# Patient Record
Sex: MALE | Race: WHITE | NOT HISPANIC OR LATINO | Employment: OTHER | URBAN - METROPOLITAN AREA
[De-identification: names, ages, dates, MRNs, and addresses within clinical notes are randomized per-mention and may not be internally consistent; named-entity substitution may affect disease eponyms.]

---

## 2018-10-05 ENCOUNTER — LAB REQUISITION (OUTPATIENT)
Dept: LAB | Facility: HOSPITAL | Age: 78
End: 2018-10-05

## 2018-10-05 DIAGNOSIS — Z00.00 ROUTINE GENERAL MEDICAL EXAMINATION AT A HEALTH CARE FACILITY: ICD-10-CM

## 2018-10-05 LAB
ANION GAP SERPL CALCULATED.3IONS-SCNC: 7 MMOL/L (ref 3–11)
BASOPHILS # BLD AUTO: 0.03 10*3/MM3 (ref 0–0.2)
BASOPHILS NFR BLD AUTO: 0.5 % (ref 0–1)
BNP SERPL-MCNC: 1963 PG/ML (ref 0–100)
BUN BLD-MCNC: 22 MG/DL (ref 9–23)
BUN/CREAT SERPL: 19 (ref 7–25)
CALCIUM SPEC-SCNC: 9.3 MG/DL (ref 8.7–10.4)
CHLORIDE SERPL-SCNC: 108 MMOL/L (ref 99–109)
CO2 SERPL-SCNC: 31 MMOL/L (ref 20–31)
CREAT BLD-MCNC: 1.16 MG/DL (ref 0.6–1.3)
DEPRECATED RDW RBC AUTO: 58.4 FL (ref 37–54)
EOSINOPHIL # BLD AUTO: 0.03 10*3/MM3 (ref 0–0.3)
EOSINOPHIL NFR BLD AUTO: 0.5 % (ref 0–3)
ERYTHROCYTE [DISTWIDTH] IN BLOOD BY AUTOMATED COUNT: 17.6 % (ref 11.3–14.5)
GFR SERPL CREATININE-BSD FRML MDRD: 61 ML/MIN/1.73
GFR SERPL CREATININE-BSD FRML MDRD: 73 ML/MIN/1.73
GLUCOSE BLD-MCNC: 136 MG/DL (ref 70–100)
HCT VFR BLD AUTO: 45.9 % (ref 38.9–50.9)
HGB BLD-MCNC: 13.9 G/DL (ref 13.1–17.5)
IMM GRANULOCYTES # BLD: 0.02 10*3/MM3 (ref 0–0.03)
IMM GRANULOCYTES NFR BLD: 0.3 % (ref 0–0.6)
LYMPHOCYTES # BLD AUTO: 0.98 10*3/MM3 (ref 0.6–4.8)
LYMPHOCYTES NFR BLD AUTO: 14.8 % (ref 24–44)
MCH RBC QN AUTO: 27.9 PG (ref 27–31)
MCHC RBC AUTO-ENTMCNC: 30.3 G/DL (ref 32–36)
MCV RBC AUTO: 92 FL (ref 80–99)
MONOCYTES # BLD AUTO: 0.55 10*3/MM3 (ref 0–1)
MONOCYTES NFR BLD AUTO: 8.3 % (ref 0–12)
NEUTROPHILS # BLD AUTO: 5.03 10*3/MM3 (ref 1.5–8.3)
NEUTROPHILS NFR BLD AUTO: 75.6 % (ref 41–71)
PLATELET # BLD AUTO: 153 10*3/MM3 (ref 150–450)
PMV BLD AUTO: 12.3 FL (ref 6–12)
POTASSIUM BLD-SCNC: 4.2 MMOL/L (ref 3.5–5.5)
RBC # BLD AUTO: 4.99 10*6/MM3 (ref 4.2–5.76)
SODIUM BLD-SCNC: 146 MMOL/L (ref 132–146)
WBC NRBC COR # BLD: 6.64 10*3/MM3 (ref 3.5–10.8)

## 2018-10-05 PROCEDURE — 85025 COMPLETE CBC W/AUTO DIFF WBC: CPT

## 2018-10-05 PROCEDURE — 83880 ASSAY OF NATRIURETIC PEPTIDE: CPT

## 2018-10-05 PROCEDURE — 80048 BASIC METABOLIC PNL TOTAL CA: CPT

## 2018-10-12 ENCOUNTER — APPOINTMENT (OUTPATIENT)
Dept: GENERAL RADIOLOGY | Facility: HOSPITAL | Age: 78
End: 2018-10-12

## 2018-10-12 ENCOUNTER — APPOINTMENT (OUTPATIENT)
Dept: CARDIOLOGY | Facility: HOSPITAL | Age: 78
End: 2018-10-12

## 2018-10-12 ENCOUNTER — HOSPITAL ENCOUNTER (OUTPATIENT)
Facility: HOSPITAL | Age: 78
Setting detail: OBSERVATION
Discharge: HOME | End: 2018-10-16
Attending: EMERGENCY MEDICINE | Admitting: EMERGENCY MEDICINE

## 2018-10-12 DIAGNOSIS — J44.1 COPD EXACERBATION (HCC): ICD-10-CM

## 2018-10-12 DIAGNOSIS — R06.03 RESPIRATORY DISTRESS: Primary | ICD-10-CM

## 2018-10-12 DIAGNOSIS — I50.23 ACUTE ON CHRONIC SYSTOLIC CONGESTIVE HEART FAILURE (HCC): ICD-10-CM

## 2018-10-12 DIAGNOSIS — J44.1 COPD WITH ACUTE EXACERBATION (HCC): ICD-10-CM

## 2018-10-12 DIAGNOSIS — Z74.09 IMPAIRED FUNCTIONAL MOBILITY, BALANCE, GAIT, AND ENDURANCE: ICD-10-CM

## 2018-10-12 PROBLEM — D69.6 THROMBOCYTOPENIA (HCC): Status: ACTIVE | Noted: 2018-10-12

## 2018-10-12 PROBLEM — R06.00 DYSPNEA: Status: ACTIVE | Noted: 2018-10-12

## 2018-10-12 PROBLEM — I50.9 ACUTE CHF (HCC): Status: ACTIVE | Noted: 2018-10-12

## 2018-10-12 PROBLEM — D64.9 ANEMIA: Status: ACTIVE | Noted: 2018-10-12

## 2018-10-12 LAB
ALBUMIN SERPL-MCNC: 3.85 G/DL (ref 3.2–4.8)
ALBUMIN/GLOB SERPL: 1.5 G/DL (ref 1.5–2.5)
ALP SERPL-CCNC: 100 U/L (ref 25–100)
ALT SERPL W P-5'-P-CCNC: 18 U/L (ref 7–40)
ANION GAP SERPL CALCULATED.3IONS-SCNC: 11 MMOL/L (ref 3–11)
ANION GAP SERPL CALCULATED.3IONS-SCNC: 9 MMOL/L (ref 3–11)
AST SERPL-CCNC: 17 U/L (ref 0–33)
BASOPHILS # BLD AUTO: 0 10*3/MM3 (ref 0–0.2)
BASOPHILS # BLD AUTO: 0.01 10*3/MM3 (ref 0–0.2)
BASOPHILS NFR BLD AUTO: 0 % (ref 0–1)
BASOPHILS NFR BLD AUTO: 0.1 % (ref 0–1)
BH CV ECHO MEAS - AO MAX PG (FULL): 1.7 MMHG
BH CV ECHO MEAS - AO MAX PG: 4 MMHG
BH CV ECHO MEAS - AO ROOT AREA (BSA CORRECTED): 1.8
BH CV ECHO MEAS - AO ROOT AREA: 10.9 CM^2
BH CV ECHO MEAS - AO ROOT DIAM: 3.7 CM
BH CV ECHO MEAS - AO V2 MAX: 104.4 CM/SEC
BH CV ECHO MEAS - AVA(V,A): 2.6 CM^2
BH CV ECHO MEAS - AVA(V,D): 2.6 CM^2
BH CV ECHO MEAS - BSA(HAYCOCK): 2.1 M^2
BH CV ECHO MEAS - BSA: 2 M^2
BH CV ECHO MEAS - BZI_BMI: 27.1 KILOGRAMS/M^2
BH CV ECHO MEAS - BZI_METRIC_HEIGHT: 177.8 CM
BH CV ECHO MEAS - BZI_METRIC_WEIGHT: 85.7 KG
BH CV ECHO MEAS - EDV(CUBED): 147.8 ML
BH CV ECHO MEAS - EDV(MOD-SP2): 98 ML
BH CV ECHO MEAS - EDV(MOD-SP4): 119 ML
BH CV ECHO MEAS - EDV(TEICH): 134.6 ML
BH CV ECHO MEAS - EF(CUBED): 41.4 %
BH CV ECHO MEAS - EF(MOD-SP2): 43.9 %
BH CV ECHO MEAS - EF(MOD-SP4): 43.7 %
BH CV ECHO MEAS - EF(TEICH): 34 %
BH CV ECHO MEAS - ESV(CUBED): 86.6 ML
BH CV ECHO MEAS - ESV(MOD-SP2): 55 ML
BH CV ECHO MEAS - ESV(MOD-SP4): 67 ML
BH CV ECHO MEAS - ESV(TEICH): 88.9 ML
BH CV ECHO MEAS - FS: 16.3 %
BH CV ECHO MEAS - IVS/LVPW: 0.71
BH CV ECHO MEAS - IVSD: 0.85 CM
BH CV ECHO MEAS - LA DIMENSION: 4 CM
BH CV ECHO MEAS - LA/AO: 1.1
BH CV ECHO MEAS - LAD MAJOR: 6.3 CM
BH CV ECHO MEAS - LAT PEAK E' VEL: 6.5 CM/SEC
BH CV ECHO MEAS - LATERAL E/E' RATIO: 13.8
BH CV ECHO MEAS - LV DIASTOLIC VOL/BSA (35-75): 58.4 ML/M^2
BH CV ECHO MEAS - LV MASS(C)D: 204.7 GRAMS
BH CV ECHO MEAS - LV MASS(C)DI: 100.5 GRAMS/M^2
BH CV ECHO MEAS - LV MAX PG: 2.3 MMHG
BH CV ECHO MEAS - LV SYSTOLIC VOL/BSA (12-30): 32.9 ML/M^2
BH CV ECHO MEAS - LV V1 MAX: 75.4 CM/SEC
BH CV ECHO MEAS - LVIDD: 5.3 CM
BH CV ECHO MEAS - LVIDS: 4.4 CM
BH CV ECHO MEAS - LVLD AP2: 8.3 CM
BH CV ECHO MEAS - LVLD AP4: 8.3 CM
BH CV ECHO MEAS - LVLS AP2: 7.5 CM
BH CV ECHO MEAS - LVLS AP4: 7.5 CM
BH CV ECHO MEAS - LVOT AREA (M): 3.5 CM^2
BH CV ECHO MEAS - LVOT AREA: 3.6 CM^2
BH CV ECHO MEAS - LVOT DIAM: 2.1 CM
BH CV ECHO MEAS - LVPWD: 1.1 CM
BH CV ECHO MEAS - MED PEAK E' VEL: 4.9 CM/SEC
BH CV ECHO MEAS - MEDIAL E/E' RATIO: 18.3
BH CV ECHO MEAS - MV A MAX VEL: 35.1 CM/SEC
BH CV ECHO MEAS - MV DEC TIME: 0.16 SEC
BH CV ECHO MEAS - MV E MAX VEL: 91 CM/SEC
BH CV ECHO MEAS - MV E/A: 2.6
BH CV ECHO MEAS - PA ACC SLOPE: 546.7 CM/SEC^2
BH CV ECHO MEAS - PA ACC TIME: 0.1 SEC
BH CV ECHO MEAS - PA PR(ACCEL): 32.7 MMHG
BH CV ECHO MEAS - PULM DIAS VEL: 67.9 CM/SEC
BH CV ECHO MEAS - PULM S/D: 0.34
BH CV ECHO MEAS - PULM SYS VEL: 23.1 CM/SEC
BH CV ECHO MEAS - RAP SYSTOLE: 15 MMHG
BH CV ECHO MEAS - RVDD: 3.5 CM
BH CV ECHO MEAS - RVSP: 37 MMHG
BH CV ECHO MEAS - SI(CUBED): 30 ML/M^2
BH CV ECHO MEAS - SI(MOD-SP2): 21.1 ML/M^2
BH CV ECHO MEAS - SI(MOD-SP4): 25.5 ML/M^2
BH CV ECHO MEAS - SI(TEICH): 22.4 ML/M^2
BH CV ECHO MEAS - SV(CUBED): 61.1 ML
BH CV ECHO MEAS - SV(MOD-SP2): 43 ML
BH CV ECHO MEAS - SV(MOD-SP4): 52 ML
BH CV ECHO MEAS - SV(TEICH): 45.7 ML
BH CV ECHO MEAS - TAPSE (>1.6): 1.4 CM2
BH CV ECHO MEAS - TR MAX PG: 22 MMHG
BH CV ECHO MEAS - TR MAX VEL: 236.4 CM/SEC
BH CV ECHO MEASUREMENTS AVERAGE E/E' RATIO: 15.96
BH CV XLRA - RV BASE: 4.2 CM
BH CV XLRA - RV LENGTH: 6.7 CM
BH CV XLRA - RV MID: 3.3 CM
BH CV XLRA - TDI S': 9.44 CM/SEC
BILIRUB SERPL-MCNC: 1.2 MG/DL (ref 0.3–1.2)
BNP SERPL-MCNC: 2091 PG/ML (ref 0–100)
BUN BLD-MCNC: 17 MG/DL (ref 9–23)
BUN BLD-MCNC: 17 MG/DL (ref 9–23)
BUN/CREAT SERPL: 14.7 (ref 7–25)
BUN/CREAT SERPL: 15.9 (ref 7–25)
CALCIUM SPEC-SCNC: 8.9 MG/DL (ref 8.7–10.4)
CALCIUM SPEC-SCNC: 9.2 MG/DL (ref 8.7–10.4)
CHLORIDE SERPL-SCNC: 107 MMOL/L (ref 99–109)
CHLORIDE SERPL-SCNC: 108 MMOL/L (ref 99–109)
CO2 SERPL-SCNC: 28 MMOL/L (ref 20–31)
CO2 SERPL-SCNC: 29 MMOL/L (ref 20–31)
CREAT BLD-MCNC: 1.07 MG/DL (ref 0.6–1.3)
CREAT BLD-MCNC: 1.16 MG/DL (ref 0.6–1.3)
DEPRECATED RDW RBC AUTO: 57.4 FL (ref 37–54)
DEPRECATED RDW RBC AUTO: 57.8 FL (ref 37–54)
EOSINOPHIL # BLD AUTO: 0 10*3/MM3 (ref 0–0.3)
EOSINOPHIL # BLD AUTO: 0.03 10*3/MM3 (ref 0–0.3)
EOSINOPHIL NFR BLD AUTO: 0 % (ref 0–3)
EOSINOPHIL NFR BLD AUTO: 0.3 % (ref 0–3)
ERYTHROCYTE [DISTWIDTH] IN BLOOD BY AUTOMATED COUNT: 17.5 % (ref 11.3–14.5)
ERYTHROCYTE [DISTWIDTH] IN BLOOD BY AUTOMATED COUNT: 17.6 % (ref 11.3–14.5)
GFR SERPL CREATININE-BSD FRML MDRD: 61 ML/MIN/1.73
GFR SERPL CREATININE-BSD FRML MDRD: 66 ML/MIN/1.73
GLOBULIN UR ELPH-MCNC: 2.6 GM/DL
GLUCOSE BLD-MCNC: 119 MG/DL (ref 70–100)
GLUCOSE BLD-MCNC: 95 MG/DL (ref 70–100)
HCT VFR BLD AUTO: 41.6 % (ref 38.9–50.9)
HCT VFR BLD AUTO: 42.2 % (ref 38.9–50.9)
HGB BLD-MCNC: 12.6 G/DL (ref 13.1–17.5)
HGB BLD-MCNC: 12.7 G/DL (ref 13.1–17.5)
IMM GRANULOCYTES # BLD: 0.02 10*3/MM3 (ref 0–0.03)
IMM GRANULOCYTES # BLD: 0.03 10*3/MM3 (ref 0–0.03)
IMM GRANULOCYTES NFR BLD: 0.3 % (ref 0–0.6)
IMM GRANULOCYTES NFR BLD: 0.3 % (ref 0–0.6)
LV EF 2D ECHO EST: 25 %
LYMPHOCYTES # BLD AUTO: 0.49 10*3/MM3 (ref 0.6–4.8)
LYMPHOCYTES # BLD AUTO: 1.07 10*3/MM3 (ref 0.6–4.8)
LYMPHOCYTES NFR BLD AUTO: 10.4 % (ref 24–44)
LYMPHOCYTES NFR BLD AUTO: 7.2 % (ref 24–44)
MAXIMAL PREDICTED HEART RATE: 140 BPM
MCH RBC QN AUTO: 27.1 PG (ref 27–31)
MCH RBC QN AUTO: 27.4 PG (ref 27–31)
MCHC RBC AUTO-ENTMCNC: 30.1 G/DL (ref 32–36)
MCHC RBC AUTO-ENTMCNC: 30.3 G/DL (ref 32–36)
MCV RBC AUTO: 90 FL (ref 80–99)
MCV RBC AUTO: 90.4 FL (ref 80–99)
MONOCYTES # BLD AUTO: 0.07 10*3/MM3 (ref 0–1)
MONOCYTES # BLD AUTO: 1.05 10*3/MM3 (ref 0–1)
MONOCYTES NFR BLD AUTO: 1 % (ref 0–12)
MONOCYTES NFR BLD AUTO: 10.2 % (ref 0–12)
NEUTROPHILS # BLD AUTO: 6.27 10*3/MM3 (ref 1.5–8.3)
NEUTROPHILS # BLD AUTO: 8.11 10*3/MM3 (ref 1.5–8.3)
NEUTROPHILS NFR BLD AUTO: 79 % (ref 41–71)
NEUTROPHILS NFR BLD AUTO: 91.8 % (ref 41–71)
PLATELET # BLD AUTO: 114 10*3/MM3 (ref 150–450)
PLATELET # BLD AUTO: 132 10*3/MM3 (ref 150–450)
PMV BLD AUTO: 11.6 FL (ref 6–12)
PMV BLD AUTO: 12 FL (ref 6–12)
POTASSIUM BLD-SCNC: 3.8 MMOL/L (ref 3.5–5.5)
POTASSIUM BLD-SCNC: 4.1 MMOL/L (ref 3.5–5.5)
PROT SERPL-MCNC: 6.4 G/DL (ref 5.7–8.2)
RBC # BLD AUTO: 4.6 10*6/MM3 (ref 4.2–5.76)
RBC # BLD AUTO: 4.69 10*6/MM3 (ref 4.2–5.76)
SODIUM BLD-SCNC: 145 MMOL/L (ref 132–146)
SODIUM BLD-SCNC: 147 MMOL/L (ref 132–146)
STRESS TARGET HR: 119 BPM
TROPONIN I SERPL-MCNC: 0 NG/ML (ref 0–0.07)
WBC NRBC COR # BLD: 10.27 10*3/MM3 (ref 3.5–10.8)
WBC NRBC COR # BLD: 6.83 10*3/MM3 (ref 3.5–10.8)

## 2018-10-12 PROCEDURE — 63710000001 FLUDROCORTISONE 0.1 MG TABLET: Performed by: NURSE PRACTITIONER

## 2018-10-12 PROCEDURE — 99220 PR INITIAL OBSERVATION CARE/DAY 70 MINUTES: CPT | Performed by: INTERNAL MEDICINE

## 2018-10-12 PROCEDURE — 94640 AIRWAY INHALATION TREATMENT: CPT

## 2018-10-12 PROCEDURE — A9270 NON-COVERED ITEM OR SERVICE: HCPCS | Performed by: NURSE PRACTITIONER

## 2018-10-12 PROCEDURE — 85025 COMPLETE CBC W/AUTO DIFF WBC: CPT | Performed by: EMERGENCY MEDICINE

## 2018-10-12 PROCEDURE — G0378 HOSPITAL OBSERVATION PER HR: HCPCS

## 2018-10-12 PROCEDURE — 85025 COMPLETE CBC W/AUTO DIFF WBC: CPT | Performed by: NURSE PRACTITIONER

## 2018-10-12 PROCEDURE — 94644 CONT INHLJ TX 1ST HOUR: CPT

## 2018-10-12 PROCEDURE — 25010000002 METHYLPREDNISOLONE PER 125 MG: Performed by: EMERGENCY MEDICINE

## 2018-10-12 PROCEDURE — 25010000002 FUROSEMIDE PER 20 MG: Performed by: EMERGENCY MEDICINE

## 2018-10-12 PROCEDURE — 96374 THER/PROPH/DIAG INJ IV PUSH: CPT

## 2018-10-12 PROCEDURE — 71045 X-RAY EXAM CHEST 1 VIEW: CPT

## 2018-10-12 PROCEDURE — 96372 THER/PROPH/DIAG INJ SC/IM: CPT

## 2018-10-12 PROCEDURE — 96376 TX/PRO/DX INJ SAME DRUG ADON: CPT

## 2018-10-12 PROCEDURE — 63710000001 DOCUSATE SODIUM 100 MG CAPSULE: Performed by: NURSE PRACTITIONER

## 2018-10-12 PROCEDURE — 63710000001 CITALOPRAM 20 MG TABLET: Performed by: NURSE PRACTITIONER

## 2018-10-12 PROCEDURE — 96375 TX/PRO/DX INJ NEW DRUG ADDON: CPT

## 2018-10-12 PROCEDURE — 25010000002 FUROSEMIDE PER 20 MG: Performed by: NURSE PRACTITIONER

## 2018-10-12 PROCEDURE — 80053 COMPREHEN METABOLIC PANEL: CPT | Performed by: EMERGENCY MEDICINE

## 2018-10-12 PROCEDURE — 25010000002 HEPARIN (PORCINE) PER 1000 UNITS: Performed by: NURSE PRACTITIONER

## 2018-10-12 PROCEDURE — 93306 TTE W/DOPPLER COMPLETE: CPT

## 2018-10-12 PROCEDURE — 63710000001 ATORVASTATIN 40 MG TABLET: Performed by: NURSE PRACTITIONER

## 2018-10-12 PROCEDURE — 99285 EMERGENCY DEPT VISIT HI MDM: CPT

## 2018-10-12 PROCEDURE — 84484 ASSAY OF TROPONIN QUANT: CPT

## 2018-10-12 PROCEDURE — 93005 ELECTROCARDIOGRAM TRACING: CPT | Performed by: EMERGENCY MEDICINE

## 2018-10-12 PROCEDURE — 83880 ASSAY OF NATRIURETIC PEPTIDE: CPT | Performed by: EMERGENCY MEDICINE

## 2018-10-12 PROCEDURE — 93306 TTE W/DOPPLER COMPLETE: CPT | Performed by: INTERNAL MEDICINE

## 2018-10-12 PROCEDURE — 63710000001 PANTOPRAZOLE 40 MG TABLET DELAYED-RELEASE: Performed by: NURSE PRACTITIONER

## 2018-10-12 RX ORDER — ATORVASTATIN CALCIUM 40 MG/1
40 TABLET, FILM COATED ORAL DAILY
COMMUNITY

## 2018-10-12 RX ORDER — FUROSEMIDE 20 MG/1
20 TABLET ORAL DAILY
COMMUNITY
End: 2018-10-16 | Stop reason: HOSPADM

## 2018-10-12 RX ORDER — IPRATROPIUM BROMIDE AND ALBUTEROL SULFATE 2.5; .5 MG/3ML; MG/3ML
3 SOLUTION RESPIRATORY (INHALATION) ONCE
Status: COMPLETED | OUTPATIENT
Start: 2018-10-12 | End: 2018-10-12

## 2018-10-12 RX ORDER — SODIUM CHLORIDE 0.9 % (FLUSH) 0.9 %
10 SYRINGE (ML) INJECTION AS NEEDED
Status: DISCONTINUED | OUTPATIENT
Start: 2018-10-12 | End: 2018-10-16 | Stop reason: HOSPADM

## 2018-10-12 RX ORDER — ATORVASTATIN CALCIUM 40 MG/1
40 TABLET, FILM COATED ORAL DAILY
Status: DISCONTINUED | OUTPATIENT
Start: 2018-10-12 | End: 2018-10-16 | Stop reason: HOSPADM

## 2018-10-12 RX ORDER — PANTOPRAZOLE SODIUM 40 MG/1
40 TABLET, DELAYED RELEASE ORAL DAILY
Status: DISCONTINUED | OUTPATIENT
Start: 2018-10-12 | End: 2018-10-16 | Stop reason: HOSPADM

## 2018-10-12 RX ORDER — CITALOPRAM 20 MG/1
20 TABLET ORAL DAILY
COMMUNITY

## 2018-10-12 RX ORDER — METHYLPREDNISOLONE SODIUM SUCCINATE 125 MG/2ML
125 INJECTION, POWDER, LYOPHILIZED, FOR SOLUTION INTRAMUSCULAR; INTRAVENOUS ONCE
Status: COMPLETED | OUTPATIENT
Start: 2018-10-12 | End: 2018-10-12

## 2018-10-12 RX ORDER — FLUDROCORTISONE ACETATE 0.1 MG/1
0.1 TABLET ORAL DAILY
COMMUNITY

## 2018-10-12 RX ORDER — POLYVINYL ALCOHOL 14 MG/ML
1 SOLUTION/ DROPS OPHTHALMIC
Status: DISCONTINUED | OUTPATIENT
Start: 2018-10-12 | End: 2018-10-16 | Stop reason: HOSPADM

## 2018-10-12 RX ORDER — HEPARIN SODIUM 5000 [USP'U]/ML
5000 INJECTION, SOLUTION INTRAVENOUS; SUBCUTANEOUS EVERY 12 HOURS SCHEDULED
Status: DISCONTINUED | OUTPATIENT
Start: 2018-10-12 | End: 2018-10-16 | Stop reason: HOSPADM

## 2018-10-12 RX ORDER — FUROSEMIDE 10 MG/ML
40 INJECTION INTRAMUSCULAR; INTRAVENOUS ONCE
Status: COMPLETED | OUTPATIENT
Start: 2018-10-12 | End: 2018-10-12

## 2018-10-12 RX ORDER — SODIUM CHLORIDE 0.9 % (FLUSH) 0.9 %
3-10 SYRINGE (ML) INJECTION AS NEEDED
Status: DISCONTINUED | OUTPATIENT
Start: 2018-10-12 | End: 2018-10-16 | Stop reason: HOSPADM

## 2018-10-12 RX ORDER — PANTOPRAZOLE SODIUM 40 MG/1
40 TABLET, DELAYED RELEASE ORAL DAILY
COMMUNITY

## 2018-10-12 RX ORDER — FUROSEMIDE 10 MG/ML
40 INJECTION INTRAMUSCULAR; INTRAVENOUS EVERY 12 HOURS
Status: DISCONTINUED | OUTPATIENT
Start: 2018-10-12 | End: 2018-10-14

## 2018-10-12 RX ORDER — FLUDROCORTISONE ACETATE 0.1 MG/1
0.1 TABLET ORAL DAILY
Status: DISCONTINUED | OUTPATIENT
Start: 2018-10-12 | End: 2018-10-16 | Stop reason: HOSPADM

## 2018-10-12 RX ORDER — ALBUTEROL SULFATE 2.5 MG/3ML
10 SOLUTION RESPIRATORY (INHALATION) CONTINUOUS
Status: DISPENSED | OUTPATIENT
Start: 2018-10-12 | End: 2018-10-12

## 2018-10-12 RX ORDER — ACETAMINOPHEN 325 MG/1
650 TABLET ORAL EVERY 4 HOURS PRN
Status: DISCONTINUED | OUTPATIENT
Start: 2018-10-12 | End: 2018-10-16 | Stop reason: HOSPADM

## 2018-10-12 RX ORDER — ENALAPRILAT 2.5 MG/2ML
1.25 INJECTION INTRAVENOUS EVERY 6 HOURS PRN
Status: DISCONTINUED | OUTPATIENT
Start: 2018-10-12 | End: 2018-10-14 | Stop reason: ALTCHOICE

## 2018-10-12 RX ORDER — CITALOPRAM 20 MG/1
20 TABLET ORAL DAILY
Status: DISCONTINUED | OUTPATIENT
Start: 2018-10-12 | End: 2018-10-16 | Stop reason: HOSPADM

## 2018-10-12 RX ORDER — SODIUM CHLORIDE 0.9 % (FLUSH) 0.9 %
3 SYRINGE (ML) INJECTION EVERY 12 HOURS SCHEDULED
Status: DISCONTINUED | OUTPATIENT
Start: 2018-10-12 | End: 2018-10-16 | Stop reason: HOSPADM

## 2018-10-12 RX ORDER — DOCUSATE SODIUM 100 MG/1
200 CAPSULE, LIQUID FILLED ORAL DAILY
Status: DISCONTINUED | OUTPATIENT
Start: 2018-10-12 | End: 2018-10-16 | Stop reason: HOSPADM

## 2018-10-12 RX ADMIN — FLUDROCORTISONE ACETATE 0.1 MG: 0.1 TABLET ORAL at 08:35

## 2018-10-12 RX ADMIN — FUROSEMIDE 40 MG: 10 INJECTION, SOLUTION INTRAMUSCULAR; INTRAVENOUS at 08:33

## 2018-10-12 RX ADMIN — HEPARIN SODIUM 5000 UNITS: 5000 INJECTION, SOLUTION INTRAVENOUS; SUBCUTANEOUS at 21:07

## 2018-10-12 RX ADMIN — ACETAMINOPHEN 650 MG: 325 TABLET ORAL at 21:07

## 2018-10-12 RX ADMIN — ALBUTEROL SULFATE 10 MG: 2.5 SOLUTION RESPIRATORY (INHALATION) at 02:43

## 2018-10-12 RX ADMIN — IPRATROPIUM BROMIDE AND ALBUTEROL SULFATE 3 ML: 2.5; .5 SOLUTION RESPIRATORY (INHALATION) at 02:43

## 2018-10-12 RX ADMIN — ATORVASTATIN CALCIUM 40 MG: 40 TABLET, FILM COATED ORAL at 08:33

## 2018-10-12 RX ADMIN — FUROSEMIDE 40 MG: 10 INJECTION, SOLUTION INTRAMUSCULAR; INTRAVENOUS at 02:35

## 2018-10-12 RX ADMIN — HEPARIN SODIUM 5000 UNITS: 5000 INJECTION, SOLUTION INTRAVENOUS; SUBCUTANEOUS at 08:33

## 2018-10-12 RX ADMIN — CITALOPRAM HYDROBROMIDE 20 MG: 20 TABLET ORAL at 08:33

## 2018-10-12 RX ADMIN — DOCUSATE SODIUM 200 MG: 100 CAPSULE, LIQUID FILLED ORAL at 08:33

## 2018-10-12 RX ADMIN — Medication 3 ML: at 21:00

## 2018-10-12 RX ADMIN — PANTOPRAZOLE SODIUM 40 MG: 40 TABLET, DELAYED RELEASE ORAL at 06:57

## 2018-10-12 RX ADMIN — Medication 3 ML: at 08:35

## 2018-10-12 RX ADMIN — ENALAPRILAT 1.25 MG: 1.25 INJECTION INTRAVENOUS at 10:02

## 2018-10-12 RX ADMIN — METHYLPREDNISOLONE SODIUM SUCCINATE 125 MG: 125 INJECTION, POWDER, FOR SOLUTION INTRAMUSCULAR; INTRAVENOUS at 02:35

## 2018-10-12 RX ADMIN — FUROSEMIDE 40 MG: 10 INJECTION, SOLUTION INTRAMUSCULAR; INTRAVENOUS at 21:07

## 2018-10-12 NOTE — H&P
[facility] Medicine Services  HISTORY AND PHYSICAL    Patient Name: Edwardo Diggs  : 1940  MRN: [id number]  Primary Care Physician: Provider, No Known  Date of admission: 10/12/2018      Subjective   Subjective     Chief Complaint:      HPI:  Edwardo Diggs is a 78 y.o. male w/ hx of CHF, COPD on 2 Liters O2 qhs prn presents w/ c/o increasing shortness of breath and inc o2 requirement w/ chest discomfort w/o radiation.  No n/v/f/c.  Does have cough.  Feels like this may be his copd.  Has received iv lasix in er and feels that this has helped as he has had uop x 2.  Notes pnd/orthopnea and leg swelling as well.      Review of Systems    Otherwise 10-system ROS reviewed and is negative except as mentioned in the HPI.    Personal History     Past Medical History:   Diagnosis Date   • Anemia    • Atherosclerosis    • CHF (congestive heart failure) (CMS/HCC)    • Constipation    • Depression    • Dermatitis    • GERD (gastroesophageal reflux disease)    • Hyperlipidemia    • Myocardial infarction (CMS/HCC)    • Orthostatic hypotension    • Parkinson's disease (CMS/HCC)    • TIA (transient ischemic attack)    • Tinea corporis        Past Surgical History:   Procedure Laterality Date   • CATARACT EXTRACTION     • CORONARY ARTERY BYPASS GRAFT         Family History: family history is not on file. not able to provide    Social History:  reports that he has quit smoking. He does not have any smokeless tobacco history on file. He reports that he does not drink alcohol or use drugs.  Social History     Social History Narrative   • No narrative on file       Medications:  Available home medication information reviewed     No Known Allergies    Objective   Objective     Vital Signs:   Temp:  [98.6 °F (37 °C)] 98.6 °F (37 °C)  Heart Rate:  [94-99] 97  Resp:  [18-24] 18  BP: (153)/(110-120) 153/110        Physical Exam    gen; alert, oriented to person, mild resp distress  Heent; perrla, eomi, mmm  Cv;  rr w/ tachycardia  L; faint crackles bll, no wheeze/poor inspir effort, mild distress  Abd; soft, +bs, ntnd  Ext; 3+ pitting edema, no cc  Skin; cdi, warm  Neuro; grossly intact  Psych; mood and affect appropriate    Results Reviewed:  I have personally reviewed current lab, radiology, and data and agree.      Results from last 7 days  Lab Units 10/12/18  0230   WBC 10*3/mm3 10.27   HEMOGLOBIN g/dL 12.7*   HEMATOCRIT % 42.2   PLATELETS 10*3/mm3 132*       Results from last 7 days  Lab Units 10/12/18  0230   SODIUM mmol/L 145   POTASSIUM mmol/L 4.1   CHLORIDE mmol/L 107   CO2 mmol/L 29.0   BUN mg/dL 17   CREATININE mg/dL 1.16   GLUCOSE mg/dL 95   CALCIUM mg/dL 9.2   ALT (SGPT) U/L 18   AST (SGOT) U/L 17     Estimated Creatinine Clearance: 61.6 mL/min (by C-G formula based on SCr of 1.16 mg/dL).  Brief Urine Lab Results     None        BNP   Date Value Ref Range Status   10/12/2018 2,091.0 (H) 0.0 - 100.0 pg/mL Final     Comment:     Results may be falsely decreased if patient taking Biotin.     Imaging Results (last 24 hours)     Procedure Component Value Units Date/Time    XR Chest 1 View [[id number]] Collected:  10/12/18 0209     Updated:  10/12/18 0308    Narrative:       EXAM:  XR Chest, 1 View    CLINICAL HISTORY:  78 years old, male; Pain; Chest pain; Left-sided chest pain; Additional info:   Chest pain/dyspnea    TECHNIQUE:  Frontal view of the chest.    COMPARISON:  No relevant prior studies available.    FINDINGS:  Lungs:  Left basilar opacity, likely atelectasis, although left lower   lobe/retrocardiac pneumonia possible.  Pleural space:  Small right pleural effusion with associated right basilar   atelectasis.  No pneumothorax.  Heart:  Cardiac silhouette is enlarged, compatible with cardiomegaly and/or   pericardial fluid.  Mediastinum:  Normal.  Bones/joints:  Changes of prior sternotomy.  Multilevel thoracic spine   degenerative changes.  Vasculature:  Atherosclerotic ossification of the thoracic  aorta.      Impression:         1.  Small right pleural effusion with associated right basilar atelectasis.    2.  Left basilar opacity, likely atelectasis, although left lower   lobe/retrocardiac pneumonia possible.  Recommend followup.    3.  Incidental/non-acute findings are described above.    THIS DOCUMENT HAS BEEN ELECTRONICALLY SIGNED BY ALEXSANDER MORENO MD             Assessment/Plan   Assessment / Plan     Active Hospital Problems    Diagnosis   • Dyspnea   • Acute CHF (CMS/HCC)   • COPD exacerbation (CMS/[facility])   • Anemia   • Thrombocytopenia (CMS/[facility])         Assessment & Plan:  77 y/o male w/ reported hx of copd, chf though details unknown here w/   1. prob acute systolic chf exacerbation;  Cont diuresis w/ 40 lasix iv bid for now; obtain echo and consult heart failure navigator.  Troponin negative.  2. ??copd exacerbation; think sx primarily due to above.  Will hold on further steroid use.  Nebs prn.  3. Anemia, mild; hx of; stable   4. Thrombocytopenia; monitor, mild.       DVT prophylaxis:heparin (monitor platelets)    CODE STATUS:  Full (unable to ask pt)  There are no questions and answers to display.       Admission Status:  I believe this patient meets  OBSERVATION status, however if further evaluation or treatment plans warrant, status may change.  Based upon current information, I predict patient's care encounter to be less than or equal to 2 midnights.      Electronically signed by Yumiko Walker MD, 10/12/18, 4:41 AM.

## 2018-10-12 NOTE — PROGRESS NOTES
IM update note:    Pt admitted after midnight with A/C CHF, has elevated BNP and significant LE edema and SOA-s/p diuresed with Lasix, not sure how much urine output is so far, RN recorded 1X urine output on chart. Will cont with current Lasix 40mg IV BID. Renal function still ok. Got steroid in ED too, but will not cont, I think this is CHF exac. Nebs PRN. Echo showed global hypokinesis with EF 25-30%. I will consult cardiology. Not sure who pt follows normally, his record here is scant.

## 2018-10-12 NOTE — PROGRESS NOTES
Discharge Planning Assessment  [facility]     Patient Name: Edwardo Diggs  MRN: [id number]  Today's Date: 10/12/2018    Admit Date: 10/12/2018          Discharge Needs Assessment     Row Name 10/12/18 1517       Living Environment    Lives With facility resident    Current Living Arrangements extended care facility    Duration at Residence Approximately 4 months    Primary Care Provided by other (see comments)   facility staff    Family Caregiver if Needed other (see comments)    Family Caregiver Names Facility staff on the [facility]    Quality of Family Relationships helpful;involved;supportive    Able to Return to Prior Arrangements yes    Living Arrangement Comments [facility] at [facility].       Resource/Environmental Concerns    Transportation Concerns other (see comments)       Transition Planning    Patient/Family Anticipates Transition to long term care facility    Transportation Anticipated family or friend will provide       Discharge Needs Assessment    Readmission Within the Last 30 Days no previous admission in last 30 days    Concerns to be Addressed denies needs/concerns at this time    Equipment Currently Used at Home rollator;oxygen    Equipment Needed After Discharge none    Discharge Facility/Level of Care Needs nursing facility, intermediate    Current Discharge Risk dependent with mobility/activities of daily living;physical impairment    Discharge Coordination/Progress Pt. lives in the [facility] at [facility].  Pt. will be able to return to [facility] at discharge.  Pt's son will assist with transportation if he is available.  Pt. uses a rollator and notornal O2 at the facility.  Pt. saw speech therapy when he first arrived to [facility] and was discharged from Physical Therapy on 10/1.  Pt. currently does not receive any therapy services.              Discharge Plan     Row Name 10/12/18 1528       Plan    Plan [facility]     Patient/Family in Agreement with Plan yes     Plan Comments SW attempted to visit pt, however he did not wake to speak with SW.  SW spoke to pt's son over the phone.  Pt's son stated pt. is not a good historian, especially when he is ill.  Pt. will return to [facility] at discharge.  Pt.'s son stated he is hopeful to assist with transportation at discharge.  At discharge, a D/C summary will need to faxed to [facility].  RN will need to call report as well.    Final Discharge Disposition Code 04 - intermediate care facility        Destination     No service coordination in this encounter.      Durable Medical Equipment     No service coordination in this encounter.      Dialysis/Infusion     No service coordination in this encounter.      Home Medical Care     No service coordination in this encounter.      Social Care     No service coordination in this encounter.                Demographic Summary     Row Name 10/12/18 1518       General Information    Admission Type observation    Arrived From [facility] term care    Preferred Language English     Used During This Interaction no    General Information Comments Pt. is followed by a provider at [facility] for medical care.            Functional Status     Row Name 10/12/18 1517       Functional Status, IADL    IADL Comments Per pt's son's report, pt. is ambulatory with a rollator.   Pt. is able to feed himself, but requires assistance for most other activities.       Employment/    Employment/ Comments Pt. has insurance through Medicare Part A only and [employer].            Psychosocial    No documentation.           Abuse/Neglect    No documentation.           Legal    No documentation.           Substance Abuse    No documentation.           Patient Forms    No documentation.         TATO Campos

## 2018-10-12 NOTE — ED PROVIDER NOTES
Subjective   Mr. Edwardo Diggs is a 78 y.o. male who presents to the ED with c/o chest pain onset today. Pt reports today he had sudden onset of chest pain localized to midsternum. He is unable to specify onset time and notes pain may be secondary to COPD exacerbation. He also complains of moderate SoA however he denies nausea, vomiting, fever, and any other acute sx at this time. Pt is poor historian and does not provide many details of present illness.  He is oriented to person, not time or place.             History provided by:  Patient  History limited by: disoriented   Chest Pain   Chest pain location: midsternum.  Pain severity:  Moderate  Onset quality:  Sudden  Progression:  Unchanged  Chronicity:  New  Relieved by:  None tried  Worsened by:  Nothing  Ineffective treatments:  None tried  Associated symptoms: shortness of breath    Associated symptoms: no fever, no nausea and no vomiting    Risk factors: male sex        Review of Systems   Unable to perform ROS: Other   Constitutional: Negative for fever.   Respiratory: Positive for shortness of breath.    Cardiovascular: Positive for chest pain.   Gastrointestinal: Negative for nausea and vomiting.       Past Medical History:   Diagnosis Date   • Anemia    • Atherosclerosis    • CHF (congestive heart failure) (CMS/HCC)    • Constipation    • Depression    • Dermatitis    • GERD (gastroesophageal reflux disease)    • Hyperlipidemia    • Myocardial infarction (CMS/HCC)    • Orthostatic hypotension    • Parkinson's disease (CMS/HCC)    • TIA (transient ischemic attack)    • Tinea corporis        No Known Allergies    Past Surgical History:   Procedure Laterality Date   • CATARACT EXTRACTION     • CORONARY ARTERY BYPASS GRAFT         History reviewed. No pertinent family history.    Social History     Social History   • Marital status:      Social History Main Topics   • Smoking status: Former Smoker   • Alcohol use No   • Drug use: No     Other Topics  Concern   • Not on file         Objective   Physical Exam   Constitutional: He appears well-developed and well-nourished. No distress.   Pt awake and alert. He is oriented to person   HENT:   Head: Normocephalic and atraumatic.   Eyes: Conjunctivae are normal. No scleral icterus.   Neck: Normal range of motion. Neck supple.   Cardiovascular: Normal heart sounds.  An irregular rhythm present. Tachycardia present.  Exam reveals no gallop and no friction rub.    No murmur heard.  Pulmonary/Chest: Accessory muscle usage present. Tachypnea noted. He is in respiratory distress (mild). He has wheezes (wheezes in bases bilaterally upon inspiration ). He has rales (crackles in bases bilaterally upon inspiration). He exhibits no tenderness.   Poor air movement.     Abdominal: Soft. Bowel sounds are normal. There is no tenderness.   Musculoskeletal: Normal range of motion. He exhibits edema (+3 edema in BLE, equal calf size).   Neurological: He is alert. He is disoriented (oriented to person). GCS eye subscore is 4. GCS verbal subscore is 5. GCS motor subscore is 6.   Skin: Skin is warm and dry.   Psychiatric: He has a normal mood and affect. His behavior is normal.   Nursing note and vitals reviewed.      Procedures         ED Course     Recent Results (from the past 24 hour(s))   Comprehensive Metabolic Panel    Collection Time: 10/12/18  2:30 AM   Result Value Ref Range    Glucose 95 70 - 100 mg/dL    BUN 17 9 - 23 mg/dL    Creatinine 1.16 0.60 - 1.30 mg/dL    Sodium 145 132 - 146 mmol/L    Potassium 4.1 3.5 - 5.5 mmol/L    Chloride 107 99 - 109 mmol/L    CO2 29.0 20.0 - 31.0 mmol/L    Calcium 9.2 8.7 - 10.4 mg/dL    Total Protein 6.4 5.7 - 8.2 g/dL    Albumin 3.85 3.20 - 4.80 g/dL    ALT (SGPT) 18 7 - 40 U/L    AST (SGOT) 17 0 - 33 U/L    Alkaline Phosphatase 100 25 - 100 U/L    Total Bilirubin 1.2 0.3 - 1.2 mg/dL    eGFR Non African Amer 61 >60 mL/min/1.73    Globulin 2.6 gm/dL    A/G Ratio 1.5 1.5 - 2.5 g/dL     BUN/Creatinine Ratio 14.7 7.0 - 25.0    Anion Gap 9.0 3.0 - 11.0 mmol/L   CBC Auto Differential    Collection Time: 10/12/18  2:30 AM   Result Value Ref Range    WBC 10.27 3.50 - 10.80 10*3/mm3    RBC 4.69 4.20 - 5.76 10*6/mm3    Hemoglobin 12.7 (L) 13.1 - 17.5 g/dL    Hematocrit 42.2 38.9 - 50.9 %    MCV 90.0 80.0 - 99.0 fL    MCH 27.1 27.0 - 31.0 pg    MCHC 30.1 (L) 32.0 - 36.0 g/dL    RDW 17.6 (H) 11.3 - 14.5 %    RDW-SD 57.8 (H) 37.0 - 54.0 fl    MPV 12.0 6.0 - 12.0 fL    Platelets 132 (L) 150 - 450 10*3/mm3    Neutrophil % 79.0 (H) 41.0 - 71.0 %    Lymphocyte % 10.4 (L) 24.0 - 44.0 %    Monocyte % 10.2 0.0 - 12.0 %    Eosinophil % 0.3 0.0 - 3.0 %    Basophil % 0.1 0.0 - 1.0 %    Immature Grans % 0.3 0.0 - 0.6 %    Neutrophils, Absolute 8.11 1.50 - 8.30 10*3/mm3    Lymphocytes, Absolute 1.07 0.60 - 4.80 10*3/mm3    Monocytes, Absolute 1.05 (H) 0.00 - 1.00 10*3/mm3    Eosinophils, Absolute 0.03 0.00 - 0.30 10*3/mm3    Basophils, Absolute 0.01 0.00 - 0.20 10*3/mm3    Immature Grans, Absolute 0.03 0.00 - 0.03 10*3/mm3   BNP    Collection Time: 10/12/18  2:30 AM   Result Value Ref Range    BNP 2,091.0 (H) 0.0 - 100.0 pg/mL   POC Troponin, Rapid    Collection Time: 10/12/18  2:35 AM   Result Value Ref Range    Troponin I 0.00 0.00 - 0.07 ng/mL     Note: In addition to lab results from this visit, the labs listed above may include labs taken at another facility or during a different encounter within the last 24 hours. Please correlate lab times with ED admission and discharge times for further clarification of the services performed during this visit.    XR Chest 1 View   Final Result      1.  Small right pleural effusion with associated right basilar atelectasis.      2.  Left basilar opacity, likely atelectasis, although left lower    lobe/retrocardiac pneumonia possible.  Recommend followup.      3.  Incidental/non-acute findings are described above.      THIS DOCUMENT HAS BEEN ELECTRONICALLY SIGNED BY ALEXSANDER MORENO  MD        Vitals:    10/12/18 0319 10/12/18 0326 10/12/18 0332 10/12/18 0333   BP:       BP Location:       Patient Position:       Pulse: 94 98 95 97   Resp:   18    Temp:       TempSrc:       SpO2: 98% 98% 97% 97%   Weight:       Height:         Medications   sodium chloride 0.9 % flush 10 mL (not administered)   albuterol (PROVENTIL) nebulizer solution 0.083% 2.5 mg/3mL (10 mg Nebulization New Bag 10/12/18 0243)   ipratropium-albuterol (DUO-NEB) nebulizer solution 3 mL (3 mL Nebulization Given 10/12/18 0243)   methylPREDNISolone sodium succinate (SOLU-Medrol) injection 125 mg (125 mg Intravenous Given 10/12/18 0235)   furosemide (LASIX) injection 40 mg (40 mg Intravenous Given 10/12/18 0235)     ECG/EMG Results (last 24 hours)     Procedure Component Value Units Date/Time    ECG 12 Lead [[id number]] Collected:  10/12/18 0206     Updated:  10/12/18 0208                        MDM  Number of Diagnoses or Management Options  Acute on chronic systolic congestive heart failure (CMS/HCC): new and requires workup  COPD with acute exacerbation (CMS/HCC): new and requires workup  Respiratory distress: new and requires workup  Diagnosis management comments: Patient presents with tachypnea, accessory muscle use, with concerning presentation of mild to moderate respiratory distress.    Lungs show wheezing and crackles throughout the bilateral bases.    Chest x-ray is concerning for pleural effusions and fluid collection in the bases.    BNP is elevated to greater than 2000.    Patient reportedly has had worsening symptoms for the last week and has been wearing oxygen constantly versus as just as needed.     The menstruation of albuterol and Atrovent neb treatment and steroids the patient's wheezing has resolved on repeat evaluation.    Crackles in the bases continues to be present.  40 mg of IV Lasix has been given.    I discussed the patient with the hospitalist, Dr. Walker.  He will be admitted for respiratory distress, COPD  exacerbation, CHF exacerbation.       Amount and/or Complexity of Data Reviewed  Clinical lab tests: ordered and reviewed  Tests in the radiology section of CPT®: ordered and reviewed  Decide to obtain previous medical records or to obtain history from someone other than the patient: yes  Obtain history from someone other than the patient: yes  Review and summarize past medical records: yes  Discuss the patient with other providers: yes  Independent visualization of images, tracings, or specimens: yes        Final diagnoses:   Respiratory distress   COPD with acute exacerbation (CMS/[facility])   Acute on chronic systolic congestive heart failure (CMS/[facility])       Documentation assistance provided by anita Abarca.  Information recorded by the anita was done at my direction and has been verified and validated by me.     Edwin Abarca  10/12/18 0209       Edwin Abarca  10/12/18 0214       Katherine Corbett MD  10/12/18 0459

## 2018-10-13 PROBLEM — I25.10 CAD (CORONARY ARTERY DISEASE): Status: ACTIVE | Noted: 2018-10-13

## 2018-10-13 PROBLEM — G45.9 TIA (TRANSIENT ISCHEMIC ATTACK): Status: ACTIVE | Noted: 2018-10-13

## 2018-10-13 PROBLEM — Z95.1 HX OF CABG: Status: ACTIVE | Noted: 2018-10-13

## 2018-10-13 PROBLEM — Z78.9 POOR HISTORIAN: Status: ACTIVE | Noted: 2018-10-13

## 2018-10-13 PROBLEM — Z86.73 HISTORY OF CARDIOEMBOLIC CEREBROVASCULAR ACCIDENT (CVA): Status: ACTIVE | Noted: 2018-10-13

## 2018-10-13 PROBLEM — I50.41 ACUTE COMBINED SYSTOLIC AND DIASTOLIC CONGESTIVE HEART FAILURE (HCC): Status: ACTIVE | Noted: 2018-10-12

## 2018-10-13 PROBLEM — J96.11 CHRONIC RESPIRATORY FAILURE WITH HYPOXIA (HCC): Status: ACTIVE | Noted: 2018-10-13

## 2018-10-13 PROBLEM — E78.5 DYSLIPIDEMIA: Status: ACTIVE | Noted: 2018-10-13

## 2018-10-13 LAB
ANION GAP SERPL CALCULATED.3IONS-SCNC: 3 MMOL/L (ref 3–11)
BNP SERPL-MCNC: 1209 PG/ML (ref 0–100)
BUN BLD-MCNC: 18 MG/DL (ref 9–23)
BUN/CREAT SERPL: 17 (ref 7–25)
CALCIUM SPEC-SCNC: 8.9 MG/DL (ref 8.7–10.4)
CHLORIDE SERPL-SCNC: 107 MMOL/L (ref 99–109)
CO2 SERPL-SCNC: 36 MMOL/L (ref 20–31)
CREAT BLD-MCNC: 1.06 MG/DL (ref 0.6–1.3)
GFR SERPL CREATININE-BSD FRML MDRD: 67 ML/MIN/1.73
GLUCOSE BLD-MCNC: 88 MG/DL (ref 70–100)
HBA1C MFR BLD: 5.7 % (ref 4.8–5.6)
POTASSIUM BLD-SCNC: 3.7 MMOL/L (ref 3.5–5.5)
SODIUM BLD-SCNC: 146 MMOL/L (ref 132–146)
TSH SERPL DL<=0.05 MIU/L-ACNC: 2.16 MIU/ML (ref 0.35–5.35)

## 2018-10-13 PROCEDURE — 99214 OFFICE O/P EST MOD 30 MIN: CPT | Performed by: INTERNAL MEDICINE

## 2018-10-13 PROCEDURE — 96372 THER/PROPH/DIAG INJ SC/IM: CPT

## 2018-10-13 PROCEDURE — 80048 BASIC METABOLIC PNL TOTAL CA: CPT | Performed by: HOSPITALIST

## 2018-10-13 PROCEDURE — 83036 HEMOGLOBIN GLYCOSYLATED A1C: CPT | Performed by: HOSPITALIST

## 2018-10-13 PROCEDURE — 83880 ASSAY OF NATRIURETIC PEPTIDE: CPT | Performed by: HOSPITALIST

## 2018-10-13 PROCEDURE — 25010000002 HEPARIN (PORCINE) PER 1000 UNITS: Performed by: NURSE PRACTITIONER

## 2018-10-13 PROCEDURE — 25010000002 FUROSEMIDE PER 20 MG: Performed by: NURSE PRACTITIONER

## 2018-10-13 PROCEDURE — G0378 HOSPITAL OBSERVATION PER HR: HCPCS

## 2018-10-13 PROCEDURE — 99226 PR SBSQ OBSERVATION CARE/DAY 35 MINUTES: CPT | Performed by: FAMILY MEDICINE

## 2018-10-13 PROCEDURE — 96376 TX/PRO/DX INJ SAME DRUG ADON: CPT

## 2018-10-13 PROCEDURE — 84443 ASSAY THYROID STIM HORMONE: CPT | Performed by: HOSPITALIST

## 2018-10-13 RX ORDER — CARVEDILOL 3.12 MG/1
3.12 TABLET ORAL EVERY 12 HOURS SCHEDULED
Status: DISCONTINUED | OUTPATIENT
Start: 2018-10-13 | End: 2018-10-14

## 2018-10-13 RX ORDER — ASPIRIN 81 MG/1
81 TABLET ORAL DAILY
Status: DISCONTINUED | OUTPATIENT
Start: 2018-10-13 | End: 2018-10-16 | Stop reason: HOSPADM

## 2018-10-13 RX ADMIN — ACETAMINOPHEN 650 MG: 325 TABLET ORAL at 23:10

## 2018-10-13 RX ADMIN — MICONAZOLE NITRATE: 2 CREAM TOPICAL at 13:31

## 2018-10-13 RX ADMIN — Medication 3 ML: at 22:07

## 2018-10-13 RX ADMIN — FLUDROCORTISONE ACETATE 0.1 MG: 0.1 TABLET ORAL at 08:35

## 2018-10-13 RX ADMIN — CARVEDILOL 3.12 MG: 3.12 TABLET, FILM COATED ORAL at 22:06

## 2018-10-13 RX ADMIN — FUROSEMIDE 40 MG: 10 INJECTION, SOLUTION INTRAMUSCULAR; INTRAVENOUS at 08:35

## 2018-10-13 RX ADMIN — ASPIRIN 81 MG: 81 TABLET, COATED ORAL at 15:57

## 2018-10-13 RX ADMIN — MICONAZOLE NITRATE: 2 CREAM TOPICAL at 22:13

## 2018-10-13 RX ADMIN — FUROSEMIDE 40 MG: 10 INJECTION, SOLUTION INTRAMUSCULAR; INTRAVENOUS at 22:06

## 2018-10-13 RX ADMIN — PANTOPRAZOLE SODIUM 40 MG: 40 TABLET, DELAYED RELEASE ORAL at 05:45

## 2018-10-13 RX ADMIN — CITALOPRAM HYDROBROMIDE 20 MG: 20 TABLET ORAL at 08:35

## 2018-10-13 RX ADMIN — HEPARIN SODIUM 5000 UNITS: 5000 INJECTION, SOLUTION INTRAVENOUS; SUBCUTANEOUS at 22:06

## 2018-10-13 RX ADMIN — HEPARIN SODIUM 5000 UNITS: 5000 INJECTION, SOLUTION INTRAVENOUS; SUBCUTANEOUS at 08:34

## 2018-10-13 RX ADMIN — ATORVASTATIN CALCIUM 40 MG: 40 TABLET, FILM COATED ORAL at 08:35

## 2018-10-13 NOTE — PROGRESS NOTES
[facility] Medicine Services  PROGRESS NOTE    Patient Name: Edwardo Diggs  : 1940  MRN: [id number]    Date of Admission: 10/12/2018  Length of Stay: 0  Primary Care Physician: Provider, No Known    Subjective   Subjective     CC:  A/C CHF    HPI:  Mild orthopnea and SOA with conversation.  Dementia, unreliable history.     ROS:  Otherwise ROS is negative except as mentioned in the HPI.    Objective   Objective     Vital Signs:   Temp:  [97.5 °F (36.4 °C)-98.1 °F (36.7 °C)] 98.1 °F (36.7 °C)  Heart Rate:  [70-79] 79  Resp:  [20] 20  BP: (117-126)/(73-89) 126/89        Physical Exam:  Constitutional: No acute distress, awake, alert, nontoxic, normal body habitus  Respiratory: Clear to auscultation bilaterally without overt crackles but has poor effort, mild dyspnea with conversation  Cardiovascular: RRR, no murmur  Gastrointestinal: Positive bowel sounds, soft, nontender, nondistended  Musculoskeletal: 2+ edema to prox tibias, normal muscle tone for age  Psychiatric: Friendly affect, good insight and judgement, cooperative  Neurologic: Dementia, movements symmetric BUE and BLE, Cranial Nerves grossly intact to confrontation, speech clear and fluent  Skin: No rashes, no jaundice, no petechiae, no mottling      Results Reviewed:  I have personally reviewed current lab, radiology, and data and agree.      Results from last 7 days  Lab Units 10/12/18  0941 10/12/18  0230   WBC 10*3/mm3 6.83 10.27   HEMOGLOBIN g/dL 12.6* 12.7*   HEMATOCRIT % 41.6 42.2   PLATELETS 10*3/mm3 114* 132*       Results from last 7 days  Lab Units 10/13/18  0422 10/12/18  0941 10/12/18  0230   SODIUM mmol/L 146 147* 145   POTASSIUM mmol/L 3.7 3.8 4.1   CHLORIDE mmol/L 107 108 107   CO2 mmol/L 36.0* 28.0 29.0   BUN mg/dL 18 17 17   CREATININE mg/dL 1.06 1.07 1.16   GLUCOSE mg/dL 88 119* 95   CALCIUM mg/dL 8.9 8.9 9.2   ALT (SGPT) U/L  --   --  18   AST (SGOT) U/L  --   --  17     Estimated Creatinine Clearance: 67.4  mL/min (by C-G formula based on SCr of 1.06 mg/dL).  BNP   Date Value Ref Range Status   10/13/2018 1,209.0 (H) 0.0 - 100.0 pg/mL Final     Comment:     Results may be falsely decreased if patient taking Biotin.       Results for orders placed during the hospital encounter of 10/12/18   Adult Transthoracic Echo Complete W/ Cont if Necessary Per Protocol    Narrative · Estimated EF = 25-30%. Global hypokinesis  · Mild mitral valve regurgitation is present  · Mild tricuspid valve regurgitation is present.  · Calculated right ventricular systolic pressure from tricuspid   regurgitation is 37 mmHg.  · Mildly reduced right ventricular systolic function noted.          I have reviewed the medications.    Assessment/Plan   Assessment / Plan     Active Hospital Problems    Diagnosis Date Noted   • **Acute combined systolic and diastolic congestive heart failure (CMS/HCC) [I50.41] 10/12/2018   • Dyslipidemia [E78.5] 10/13/2018   • CAD (coronary artery disease) [I25.10] 10/13/2018   • History of cardioembolic cerebrovascular accident (CVA) [Z86.73] 10/13/2018   • Poor historian [Z78.9] 10/13/2018   • Chronic respiratory failure with hypoxia (CMS/[facility]) [J96.11] 10/13/2018   • Dyspnea [R06.00] 10/12/2018   • COPD exacerbation (CMS/[facility]) [J44.1] 10/12/2018   • Anemia [D64.9] 10/12/2018   • Thrombocytopenia (CMS/[facility]) [D69.6] 10/12/2018   • Respiratory distress [R06.03] 10/12/2018      Resolved Hospital Problems    Diagnosis Date Noted Date Resolved   No resolved problems to display.          Brief Hospital Course to date:  Edwardo Diggs is a 78 y.o. male with PMHx of combined CHF, COPD on chronic 2 Liters O2 qhs prn presented to [facility] ED c/o increasing shortness of breath and increased O2 requirement w/ chest discomfort.    Assessment and Plan:  Please see above Hospital Problem List which is being actively managed to reflect all current/pertinent diagnoses, the following items may only represent today's acute or active assessments  and/or plans of care.    A/C systolic and diastolic CHF  - IV diuresis  - EF 25% -->  Cardiology follows, will obtain outpt Card record for comparison Monday.  May need repeat ischemic eval if markedly changed from known prior.     CAD hx of CABG x5  - BB and ASA added, continue home dose statin     COPD  Chronic hypoxic failure, 2L baseline  Hx of CVA  - stable, chronic    DVT Prophylaxis:  SC hep    CODE STATUS:   Code Status and Medical Interventions:   Ordered at: 10/12/18 0536     Level Of Support Discussed With:    Patient     Code Status:    CPR     Medical Interventions (Level of Support Prior to Arrest):    Full       Disposition: I expect the patient to be discharged back to [facility].      Electronically signed by Santa Perez MD, 10/13/18, 3:40 PM.

## 2018-10-13 NOTE — CONSULTS
[place] [facility] at [facility]        Date of Hospital Visit: 10/13/18      Place of Service: [facility]    Patient Name: Edwardo Diggs  :1940    Referral Provider: Yumiko Walker MD  Primary Care Provider: Provider, No Known  Primary Cardiologist: Binh Hughes MD      Chief complaint/Reason for Consultation:  congestive heart failure    Problem List:  Problem   Cad (Coronary Artery Disease)    1. H/O CABG     Acute Chf (Cms/[facility]ly [facility])    1. Echo 10-12-18  · Estimated EF = 25-30%. Global hypokinesis  · Mildly reduced right ventricular systolic function noted.     Dyslipidemia       Patient Active Problem List    Diagnosis   • CAD (coronary artery disease) [I25.10]   • Acute CHF (CMS/Formerly [facility]) [I50.9]   • Dyslipidemia [E78.5]   • Dyspnea [R06.00]   • COPD exacerbation (CMS/[facility]ly [facility]) [J44.1]   • Anemia [D64.9]   • Thrombocytopenia (CMS/[facility]ly [facility]) [D69.6]   • Respiratory distress [R06.03]         History of Present Illness:  This is an 78 year old male poor historian with a history of CABG ×5 after myocardial infarction.  He presented to the emergency department yesterday with a complaint of chest pain.  His findings were consistent with acute congestive heart failure and he was admitted to the hospitalist service.  Myocardial infarction has been excluded.  At that time he was felt to possibly have COPD exacerbation.  Today he cannot tell me how he got here or where he was prior to admission at [facility].  He denies a history of congestive heart failure or COPD.  He may or may not have a prior history of atrial fibrillation.  He does know that he's had a previous MI and a CABG ×5 done somewhere here in [place] and a history of CVA of unclear etiology.  At present he is awake and alert and in no present distress.  He continues complain of mild lower extremity edema but has no chest pain and his breathing much better.    Past Medical History:   Diagnosis Date   • Anemia    • Atherosclerosis    •  Constipation    • Depression    • Dermatitis    • GERD (gastroesophageal reflux disease)    • Hyperlipidemia    • Myocardial infarction (CMS/HCC)    • Orthostatic hypotension    • Parkinson's disease (CMS/HCC)    • TIA (transient ischemic attack)    • Tinea corporis        Past Surgical History:   Procedure Laterality Date   • CATARACT EXTRACTION     • CORONARY ARTERY BYPASS GRAFT         Allergies   Allergen Reactions   • Eggs Or Egg-Derived Products Unknown (See Comments)       Prescriptions Prior to Admission   Medication Sig Dispense Refill Last Dose   • atorvastatin (LIPITOR) 40 MG tablet Take 40 mg by mouth Daily.      • citalopram (CeleXA) 20 MG tablet Take 20 mg by mouth Daily.      • DOCUSATE SODIUM PO Take 200 mg by mouth Daily.      • fludrocortisone 0.1 MG tablet Take 0.1 mg by mouth Daily.      • furosemide (LASIX) 20 MG tablet Take 20 mg by mouth Daily.      • pantoprazole (PROTONIX) 40 MG EC tablet Take 40 mg by mouth Daily.      • Propylene Glycol 0.6 % solution Apply 0.6 % to eye(s) as directed by provider 3 (Three) Times a Day.            Current Facility-Administered Medications:   •  acetaminophen (TYLENOL) tablet 650 mg, 650 mg, Oral, Q4H PRN, Laura, Yulissa, APRN, 650 mg at 10/12/18 2107  •  atorvastatin (LIPITOR) tablet 40 mg, 40 mg, Oral, Daily, Laura, Yulissa, APRN, 40 mg at 10/13/18 0835  •  citalopram (CeleXA) tablet 20 mg, 20 mg, Oral, Daily, Laura, Yulissa, APRN, 20 mg at 10/13/18 0835  •  docusate sodium (COLACE) capsule 200 mg, 200 mg, Oral, Daily, Laura, Yulissa, APRN, 200 mg at 10/12/18 0833  •  enalaprilat (VASOTEC) injection 1.25 mg, 1.25 mg, Intravenous, Q6H PRN, Wild Myers MD, 1.25 mg at 10/12/18 1002  •  fludrocortisone tablet 0.1 mg, 0.1 mg, Oral, Daily, Laura, Yulissa, APRN, 0.1 mg at 10/13/18 0835  •  furosemide (LASIX) injection 40 mg, 40 mg, Intravenous, Q12H, Laura, Yulissa, APRN, 40 mg at 10/13/18 0835  •  heparin (porcine) 5000 UNIT/ML injection  5,000 Units, 5,000 Units, Subcutaneous, Q12H, Laura, Yulissa, APRN, 5,000 Units at 10/13/18 0834  •  miconazole (MICOTIN) 2 % cream, , Topical, Q12H, Santa Perez MD  •  pantoprazole (PROTONIX) EC tablet 40 mg, 40 mg, Oral, Daily, Laura, Yulissa, APRN, 40 mg at 10/13/18 0545  •  polyvinyl alcohol (LIQUIFILM) 1.4 % ophthalmic solution 1 drop, 1 drop, Both Eyes, Q1H PRN, Laura, Yulissa, APRN  •  Insert peripheral IV, , , Once **AND** sodium chloride 0.9 % flush 10 mL, 10 mL, Intravenous, PRN, Katherine Corbett MD  •  sodium chloride 0.9 % flush 3 mL, 3 mL, Intravenous, Q12H, Laura, Yulissa, APRN, 3 mL at 10/12/18 2100  •  sodium chloride 0.9 % flush 3-10 mL, 3-10 mL, Intravenous, PRN, Laura, Yulissa, APRN      Social History     Social History   • Marital status:      Spouse name: N/A   • Number of children: N/A   • Years of education: N/A     Occupational History   • Not on file.     Social History Main Topics   • Smoking status: Former Smoker   • Smokeless tobacco: Never Used   • Alcohol use No   • Drug use: No   • Sexual activity: Defer     Other Topics Concern   • Not on file     Social History Narrative   • No narrative on file       History reviewed. No pertinent family history.    REVIEW OF SYSTEMS:   Review of Systems   Constitution: Negative.   HENT: Negative.    Eyes: Negative.    Cardiovascular: Positive for chest pain and leg swelling.   Respiratory: Positive for shortness of breath.    Endocrine: Negative.    Hematologic/Lymphatic: Negative.    Skin: Negative.    Musculoskeletal: Negative.    Gastrointestinal: Negative.    Genitourinary: Negative.    Neurological: Negative.    Psychiatric/Behavioral: Negative.    Allergic/Immunologic: Negative.    All other systems reviewed and are negative.           Objective:  Vitals:    10/12/18 1512 10/12/18 2010 10/13/18 0545 10/13/18 0700   BP:  117/73 124/74 126/89   BP Location:  Right arm Right arm Right arm   Patient Position:  Lying Lying  "Lying   Pulse:  73 70 79   Resp:  20 20 20   Temp:  97.8 °F (36.6 °C) 97.5 °F (36.4 °C) 98.1 °F (36.7 °C)   TempSrc:  Oral Oral Oral   SpO2:       Weight: 85.7 kg (189 lb)      Height: 177.8 cm (70\")        Body mass index is 27.12 kg/m².  Flowsheet Rows      First Filed Value   Admission Height  177.8 cm (70\") Documented at 10/12/2018 0205   Admission Weight  85.7 kg (189 lb) Documented at 10/12/2018 0205        No intake or output data in the 24 hours ending 10/13/18 1239    Physical Exam   General: No acute distress, well-developed and well-nourished.    Skin: Skin is warm and dry. No obvious cyanosis, erythema or pallor.   HEENT: Atraumatic, normocephalic, no conjunctival pallor, no scleral icterus.     Chest:No respiratory distress No chest wall tenderness. he has mild wheezing upper and lower lobes bilaterally with scattered rhonchi.  Cardiovascular: Normal S1 and S2, no murmur, gallop or rub. PMI is not displaced.    Pulses:Radial and pedal pulses are 2+ and symmetric.    Abdomen: Soft, nontender, normal bowel sounds.   Musculoskeletal/Extremities:  No clubbing, cyanosis.  There is trace edema. No gross deformity.   Neurological: Alert and oriented to person, place, and time, no gross focal deficits.   Psychiatric: Normal mood and affect.Speech and behavior are normal.      Lab Review:                  Results from last 7 days  Lab Units 10/13/18  0422   SODIUM mmol/L 146   POTASSIUM mmol/L 3.7   CHLORIDE mmol/L 107   CO2 mmol/L 36.0*   BUN mg/dL 18   CREATININE mg/dL 1.06   GLUCOSE mg/dL 88   CALCIUM mg/dL 8.9           Results from last 7 days  Lab Units 10/12/18  0941   WBC 10*3/mm3 6.83   HEMOGLOBIN g/dL 12.6*   HEMATOCRIT % 41.6   PLATELETS 10*3/mm3 114*                   Results from last 7 days  Lab Units 10/13/18  0421   BNP pg/mL 1,209.0*       Component      Latest Ref Rng & Units 10/12/2018   Troponin I      0.00 - 0.07 ng/mL 0.00       EKG:                 Assessment:   1. Acute systolic heart " failure with undetermined chronicity, EF 25%  2. History of remote CABG ×5, with acute chest pain now resolved and negative cardiac markers  3. Dyslipidemia on statin therapy  4. Poor historian and data deficit  5. Probable dementia.    Plan:   1. Agree with diuresis with IV Lasix.  Monitor intake and output.  Monitor renal function and changed to oral Lasix from tomorrow.  2. Add aspirin, continue subcutaneous heparin and statin therapy.  3. Add carvedilol 3.125 mg twice a day.  4. Will try to obtain records from his primary cardiologist on Monday.  If low ejection fraction is a new finding he should be considered for repeat ischemic evaluation otherwise continue to optimize medical management and further addition of long-acting nitrates if blood pressure allows.    5. Thank you for this consultation, we will follow.     Scribed for Cecy Shirley MD. by Irvin Fregoso PA-C. 10/13/2018  12:39 PM     Cecy GEE MD, personally performed the services described in this documentation as scribed by the above named individual in my presence, and it is both accurate and complete.  10/13/2018  2:33 PM

## 2018-10-14 PROCEDURE — 99225 PR SBSQ OBSERVATION CARE/DAY 25 MINUTES: CPT | Performed by: FAMILY MEDICINE

## 2018-10-14 PROCEDURE — 25010000002 HEPARIN (PORCINE) PER 1000 UNITS: Performed by: NURSE PRACTITIONER

## 2018-10-14 PROCEDURE — G0378 HOSPITAL OBSERVATION PER HR: HCPCS

## 2018-10-14 PROCEDURE — 99214 OFFICE O/P EST MOD 30 MIN: CPT | Performed by: INTERNAL MEDICINE

## 2018-10-14 PROCEDURE — 25010000002 FUROSEMIDE PER 20 MG: Performed by: NURSE PRACTITIONER

## 2018-10-14 PROCEDURE — 96376 TX/PRO/DX INJ SAME DRUG ADON: CPT

## 2018-10-14 PROCEDURE — 96372 THER/PROPH/DIAG INJ SC/IM: CPT

## 2018-10-14 RX ORDER — FUROSEMIDE 40 MG/1
40 TABLET ORAL DAILY
Status: DISCONTINUED | OUTPATIENT
Start: 2018-10-15 | End: 2018-10-16 | Stop reason: HOSPADM

## 2018-10-14 RX ORDER — CARVEDILOL 6.25 MG/1
6.25 TABLET ORAL EVERY 12 HOURS SCHEDULED
Status: DISCONTINUED | OUTPATIENT
Start: 2018-10-14 | End: 2018-10-16 | Stop reason: HOSPADM

## 2018-10-14 RX ADMIN — MICONAZOLE NITRATE: 2 CREAM TOPICAL at 22:04

## 2018-10-14 RX ADMIN — ACETAMINOPHEN 650 MG: 325 TABLET ORAL at 06:04

## 2018-10-14 RX ADMIN — ATORVASTATIN CALCIUM 40 MG: 40 TABLET, FILM COATED ORAL at 09:17

## 2018-10-14 RX ADMIN — CARVEDILOL 6.25 MG: 6.25 TABLET, FILM COATED ORAL at 20:56

## 2018-10-14 RX ADMIN — FLUDROCORTISONE ACETATE 0.1 MG: 0.1 TABLET ORAL at 09:17

## 2018-10-14 RX ADMIN — MICONAZOLE NITRATE: 2 CREAM TOPICAL at 09:23

## 2018-10-14 RX ADMIN — HEPARIN SODIUM 5000 UNITS: 5000 INJECTION, SOLUTION INTRAVENOUS; SUBCUTANEOUS at 20:56

## 2018-10-14 RX ADMIN — PANTOPRAZOLE SODIUM 40 MG: 40 TABLET, DELAYED RELEASE ORAL at 05:52

## 2018-10-14 RX ADMIN — Medication 3 ML: at 22:03

## 2018-10-14 RX ADMIN — CARVEDILOL 3.12 MG: 3.12 TABLET, FILM COATED ORAL at 09:17

## 2018-10-14 RX ADMIN — ASPIRIN 81 MG: 81 TABLET, COATED ORAL at 09:17

## 2018-10-14 RX ADMIN — SACUBITRIL AND VALSARTAN 1 TABLET: 24; 26 TABLET, FILM COATED ORAL at 21:59

## 2018-10-14 RX ADMIN — HEPARIN SODIUM 5000 UNITS: 5000 INJECTION, SOLUTION INTRAVENOUS; SUBCUTANEOUS at 09:17

## 2018-10-14 RX ADMIN — SACUBITRIL AND VALSARTAN 1 TABLET: 24; 26 TABLET, FILM COATED ORAL at 12:36

## 2018-10-14 RX ADMIN — FUROSEMIDE 40 MG: 10 INJECTION, SOLUTION INTRAMUSCULAR; INTRAVENOUS at 09:17

## 2018-10-14 RX ADMIN — Medication 3 ML: at 09:18

## 2018-10-14 RX ADMIN — CITALOPRAM HYDROBROMIDE 20 MG: 20 TABLET ORAL at 09:16

## 2018-10-14 NOTE — PROGRESS NOTES
"[place] [facility] at [facility]  IP Progress Note   LOS: 0 days   Patient Care Team:  Provider, No Known as PCP - Binh Carreon MD as Consulting Physician (Cardiology)    Chief Complaint: Follow up for Systolic Heart Failure, CM, CAD.    Subjective    Resting comfortably, denies any recurrence of chest pain, no shortness of breath.  No nausea or vomiting.    Tele: Sinus Rythym     Vitals:  Blood pressure 157/98, pulse 74, temperature 98 °F (36.7 °C), temperature source Oral, resp. rate 18, height 177.8 cm (70\"), weight 84.1 kg (185 lb 6 oz), SpO2 94 %.   No intake or output data in the 24 hours ending 10/14/18 1046    Physical Exam:  General: No apparent distress.  Neck: no JVD.  Chest:No respiratory distress, breath sounds are normal. No wheezes,  rhonchi or rales.  Cardiovascular: Normal S1 and S2, 2/6 murmur.      Extremities: No edema.        Results Review:     I reviewed the patient's new clinical results.      Results from last 7 days  Lab Units 10/12/18  0941   WBC 10*3/mm3 6.83   HEMOGLOBIN g/dL 12.6*   HEMATOCRIT % 41.6   PLATELETS 10*3/mm3 114*       Results from last 7 days  Lab Units 10/13/18  0422  10/12/18  0230   SODIUM mmol/L 146  < > 145   POTASSIUM mmol/L 3.7  < > 4.1   CHLORIDE mmol/L 107  < > 107   CO2 mmol/L 36.0*  < > 29.0   BUN mg/dL 18  < > 17   CREATININE mg/dL 1.06  < > 1.16   CALCIUM mg/dL 8.9  < > 9.2   BILIRUBIN mg/dL  --   --  1.2   ALK PHOS U/L  --   --  100   ALT (SGPT) U/L  --   --  18   AST (SGOT) U/L  --   --  17   GLUCOSE mg/dL 88  < > 95   < > = values in this interval not displayed.      No results found for: TROPONINI    Results from last 7 days  Lab Units 10/13/18  0422   TSH mIU/mL 2.162           Results from last 7 days  Lab Units 10/13/18  0421   BNP pg/mL 1,209.0*       Scheduled Meds:  aspirin 81 mg Oral Daily   atorvastatin 40 mg Oral Daily   carvedilol 3.125 mg Oral Q12H   citalopram 20 mg Oral Daily   docusate sodium 200 mg Oral " Daily   fludrocortisone 0.1 mg Oral Daily   furosemide 40 mg Intravenous Q12H   heparin (porcine) 5,000 Units Subcutaneous Q12H   miconazole  Topical Q12H   pantoprazole 40 mg Oral Daily   sodium chloride 3 mL Intravenous Q12H       Assessment:   1. Acute systolic heart failure with undetermined chronicity, EF 25%  2. History of remote CABG ×5, with acute chest pain now resolved and negative cardiac markers  3. Dyslipidemia on statin therapy  4. Poor historian and data deficit  5. Probable dementia.  Plan:   1. Appears euvolemic, we will change Lasix to by mouth.  2. Increase Coreg, add Entresto.  3. Cardiac PET scan tomorrow for ischemic evaluation.    CHANTEL Harris  10/14/18  9:13 AM    I have seen and examined the patient, case was discussed with the physician extender, reviewed the above note, necessary changes were made and I agree with the final note.   Cecy Shirley MD, FACC, [facility]

## 2018-10-14 NOTE — NURSING NOTE
Condom catheter used to attempt monitoring of strict I and O. Condom failing to stay on, three different attempts made with different sizes with no success. Spoke with Soraya RODRÍGUEZ to discuss possibility of anchoring a rizo. Per her no rizo tonight continue to chart incontinent episodes.

## 2018-10-14 NOTE — PROGRESS NOTES
[facility] Medicine Services  PROGRESS NOTE    Patient Name: Edwardo Diggs  : 1940  MRN: [id number]    Date of Admission: 10/12/2018  Length of Stay: 0  Primary Care Physician: Provider, No Known    Subjective   Subjective     CC:  A/C CHF    HPI:  No SOA, no edema.  No issues per nursing.       ROS:  Otherwise ROS is negative except as mentioned in the HPI.    Objective   Objective     Vital Signs:   Temp:  [98 °F (36.7 °C)-98.8 °F (37.1 °C)] 98 °F (36.7 °C)  Heart Rate:  [74-95] 80  Resp:  [18-20] 18  BP: (114-157)/(81-98) 138/91        Physical Exam:  Constitutional: No acute distress, awake, alert, nontoxic, normal body habitus  Respiratory: Clear to auscultation bilaterally without overt crackles but has poor effort, nonlabored  Cardiovascular: RRR, no murmur  Gastrointestinal: Positive bowel sounds, soft, nontender, nondistended  Musculoskeletal: trace ankle edema, normal muscle tone for age  Psychiatric: Friendly affect, good insight and judgement, cooperative  Neurologic: Dementia, movements symmetric BUE and BLE, Cranial Nerves grossly intact to confrontation, speech clear and fluent  Skin: No rashes, no jaundice, no petechiae, no mottling      Results Reviewed:  I have personally reviewed current lab, radiology, and data and agree.      Results from last 7 days  Lab Units 10/12/18  0941 10/12/18  0230   WBC 10*3/mm3 6.83 10.27   HEMOGLOBIN g/dL 12.6* 12.7*   HEMATOCRIT % 41.6 42.2   PLATELETS 10*3/mm3 114* 132*       Results from last 7 days  Lab Units 10/13/18  0422 10/12/18  0941 10/12/18  0230   SODIUM mmol/L 146 147* 145   POTASSIUM mmol/L 3.7 3.8 4.1   CHLORIDE mmol/L 107 108 107   CO2 mmol/L 36.0* 28.0 29.0   BUN mg/dL 18 17 17   CREATININE mg/dL 1.06 1.07 1.16   GLUCOSE mg/dL 88 119* 95   CALCIUM mg/dL 8.9 8.9 9.2   ALT (SGPT) U/L  --   --  18   AST (SGOT) U/L  --   --  17     Estimated Creatinine Clearance: 66.1 mL/min (by C-G formula based on SCr of 1.06  mg/dL).  BNP   Date Value Ref Range Status   10/13/2018 1,209.0 (H) 0.0 - 100.0 pg/mL Final     Comment:     Results may be falsely decreased if patient taking Biotin.       Results for orders placed during the hospital encounter of 10/12/18   Adult Transthoracic Echo Complete W/ Cont if Necessary Per Protocol    Narrative · Estimated EF = 25-30%. Global hypokinesis  · Mild mitral valve regurgitation is present  · Mild tricuspid valve regurgitation is present.  · Calculated right ventricular systolic pressure from tricuspid   regurgitation is 37 mmHg.  · Mildly reduced right ventricular systolic function noted.          I have reviewed the medications.    Assessment/Plan   Assessment / Plan     Active Hospital Problems    Diagnosis Date Noted   • **Acute combined systolic and diastolic congestive heart failure (CMS/HCC) [I50.41] 10/12/2018   • Dyslipidemia [E78.5] 10/13/2018   • CAD (coronary artery disease) [I25.10] 10/13/2018   • History of cardioembolic cerebrovascular accident (CVA) [Z86.73] 10/13/2018   • Poor historian [Z78.9] 10/13/2018   • Chronic respiratory failure with hypoxia (CMS/[facility]) [J96.11] 10/13/2018   • Hx of CABG [Z95.1] 10/13/2018   • Dyspnea [R06.00] 10/12/2018   • COPD exacerbation (CMS/[facility]) [J44.1] 10/12/2018   • Anemia [D64.9] 10/12/2018   • Thrombocytopenia (CMS/[facility]) [D69.6] 10/12/2018   • Respiratory distress [R06.03] 10/12/2018      Resolved Hospital Problems    Diagnosis Date Noted Date Resolved   No resolved problems to display.          Brief Hospital Course to date:  Edwardo Diggs is a 78 y.o. male with PMHx of combined CHF, COPD on chronic 2 Liters O2 qhs prn presented to [facility] ED c/o increasing shortness of breath and increased O2 requirement w/ chest discomfort.    Assessment and Plan:  Please see above Hospital Problem List which is being actively managed to reflect all current/pertinent diagnoses, the following items may only represent today's acute or active assessments and/or plans  of care.    A/C systolic and diastolic CHF  - s/p IV diuresis, now converted to PO Lasix (have increased home dose from 20mg to 40mg)  - EF 25%     CAD hx of CABG x5  - BB and ASA added, continue home dose statin   - Cardiac PET in am for ischemic eval given low EF% and unclear prior baseline    COPD  Chronic hypoxic failure, 2L baseline  Hx of CVA  - stable, chronic    DVT Prophylaxis:  SC hep    CODE STATUS:   Code Status and Medical Interventions:   Ordered at: 10/12/18 0536     Level Of Support Discussed With:    Patient     Code Status:    CPR     Medical Interventions (Level of Support Prior to Arrest):    Full       Disposition: I expect the patient to be discharged back to [facility].      Electronically signed by Santa Perez MD, 10/14/18, 3:13 PM.

## 2018-10-15 ENCOUNTER — APPOINTMENT (OUTPATIENT)
Dept: CARDIOLOGY | Facility: HOSPITAL | Age: 78
End: 2018-10-15
Attending: INTERNAL MEDICINE

## 2018-10-15 PROCEDURE — 99225 PR SBSQ OBSERVATION CARE/DAY 25 MINUTES: CPT | Performed by: FAMILY MEDICINE

## 2018-10-15 PROCEDURE — G0378 HOSPITAL OBSERVATION PER HR: HCPCS

## 2018-10-15 PROCEDURE — G8979 MOBILITY GOAL STATUS: HCPCS | Performed by: PHYSICAL THERAPIST

## 2018-10-15 PROCEDURE — 99214 OFFICE O/P EST MOD 30 MIN: CPT | Performed by: INTERNAL MEDICINE

## 2018-10-15 PROCEDURE — 25010000002 HEPARIN (PORCINE) PER 1000 UNITS: Performed by: NURSE PRACTITIONER

## 2018-10-15 PROCEDURE — 96372 THER/PROPH/DIAG INJ SC/IM: CPT

## 2018-10-15 PROCEDURE — 97162 PT EVAL MOD COMPLEX 30 MIN: CPT | Performed by: PHYSICAL THERAPIST

## 2018-10-15 PROCEDURE — G8978 MOBILITY CURRENT STATUS: HCPCS | Performed by: PHYSICAL THERAPIST

## 2018-10-15 RX ADMIN — HEPARIN SODIUM 5000 UNITS: 5000 INJECTION, SOLUTION INTRAVENOUS; SUBCUTANEOUS at 20:59

## 2018-10-15 RX ADMIN — ASPIRIN 81 MG: 81 TABLET, COATED ORAL at 08:42

## 2018-10-15 RX ADMIN — CITALOPRAM HYDROBROMIDE 20 MG: 20 TABLET ORAL at 08:42

## 2018-10-15 RX ADMIN — ACETAMINOPHEN 650 MG: 325 TABLET ORAL at 15:28

## 2018-10-15 RX ADMIN — MICONAZOLE NITRATE: 2 CREAM TOPICAL at 21:00

## 2018-10-15 RX ADMIN — Medication 3 ML: at 08:47

## 2018-10-15 RX ADMIN — HEPARIN SODIUM 5000 UNITS: 5000 INJECTION, SOLUTION INTRAVENOUS; SUBCUTANEOUS at 08:42

## 2018-10-15 RX ADMIN — CARVEDILOL 6.25 MG: 6.25 TABLET, FILM COATED ORAL at 08:42

## 2018-10-15 RX ADMIN — MICONAZOLE NITRATE: 2 CREAM TOPICAL at 08:47

## 2018-10-15 RX ADMIN — FUROSEMIDE 40 MG: 40 TABLET ORAL at 08:42

## 2018-10-15 RX ADMIN — SACUBITRIL AND VALSARTAN 1 TABLET: 24; 26 TABLET, FILM COATED ORAL at 08:45

## 2018-10-15 RX ADMIN — SACUBITRIL AND VALSARTAN 1 TABLET: 24; 26 TABLET, FILM COATED ORAL at 20:58

## 2018-10-15 RX ADMIN — CARVEDILOL 6.25 MG: 6.25 TABLET, FILM COATED ORAL at 20:59

## 2018-10-15 RX ADMIN — Medication 3 ML: at 21:01

## 2018-10-15 RX ADMIN — ATORVASTATIN CALCIUM 40 MG: 40 TABLET, FILM COATED ORAL at 08:42

## 2018-10-15 RX ADMIN — FLUDROCORTISONE ACETATE 0.1 MG: 0.1 TABLET ORAL at 08:45

## 2018-10-15 RX ADMIN — PANTOPRAZOLE SODIUM 40 MG: 40 TABLET, DELAYED RELEASE ORAL at 06:03

## 2018-10-15 NOTE — PROGRESS NOTES
Continued Stay Note  [facility]     Patient Name: Edwardo Diggs  MRN: [id number]  Today's Date: 10/15/2018    Admit Date: 10/12/2018          Discharge Plan     Row Name 10/15/18 1623       Plan    Plan SW attempted to contact pt's son    Patient/Family in Agreement with Plan yes    Plan Comments SW was notified by RN that pt. will not have PET tomorrow; pt likely to be discharged tomorrow.  SW called pt's son and left a message with update.  REYNA provided call back numbers to SW and RN unit.  SW will need to clarify if son plans to provide transportation.  SW provided update to staff RN.              Discharge Codes    No documentation.           TATO Campos

## 2018-10-15 NOTE — PROGRESS NOTES
"  [place] [facility] at [facility]  PROGRESS NOTE    Date of Admission: 10/12/2018  Length of Stay: 0  Primary Care Physician: Provider, No Known    Chief Complaint: f/u SHF, CAD    Subjective      Patient states his breathing is improved, has gotten up to chair. No recurrent chest pain. Stress PET this AM canceled as patient ate breakfast.  At the time of my evaluation he had been incontinent of urine and feces and was doing stressed.    Objective   Vitals: /100 (BP Location: Left arm, Patient Position: Sitting)   Pulse 71   Temp 97.5 °F (36.4 °C) (Oral)   Resp 18   Ht 177.8 cm (70\")   Wt 84 kg (185 lb 2 oz)   SpO2 94%   BMI 26.56 kg/m²     Physical Exam:  GENERAL: Alert, cooperative, in no acute distress.   HEENT: Normocephalic, no jugular venous distention  HEART: No discrete PMI is noted. Regular rhythm, normal rate, and no murmurs, gallops, or rubs.   LUNGS: Clear to auscultation bilaterally. No wheezing, rales or rhonchi.  ABDOMEN: Soft, bowel sounds present, non-tender   NEUROLOGIC: No focal abnormalities involving strength or sensation are noted.   EXTREMITIES: No clubbing, cyanosis, or edema noted.     Results:    Results from last 7 days  Lab Units 10/12/18  0941 10/12/18  0230   WBC 10*3/mm3 6.83 10.27   HEMOGLOBIN g/dL 12.6* 12.7*   HEMATOCRIT % 41.6 42.2   PLATELETS 10*3/mm3 114* 132*       Results from last 7 days  Lab Units 10/13/18  0422 10/12/18  0941 10/12/18  0230   SODIUM mmol/L 146 147* 145   POTASSIUM mmol/L 3.7 3.8 4.1   CHLORIDE mmol/L 107 108 107   CO2 mmol/L 36.0* 28.0 29.0   BUN mg/dL 18 17 17   CREATININE mg/dL 1.06 1.07 1.16   GLUCOSE mg/dL 88 119* 95      Lab Results   Component Value Date    AST 17 10/12/2018    ALT 18 10/12/2018       Results from last 7 days  Lab Units 10/13/18  0422   HEMOGLOBIN A1C % 5.70*           Results from last 7 days  Lab Units 10/13/18  0422   TSH mIU/mL 2.162       Results from last 7 days  Lab Units 10/13/18  0421 " 10/12/18  0230   BNP pg/mL 1,209.0* 2,091.0*     No intake or output data in the 24 hours ending 10/15/18 1253    I personally reviewed the patient's EKG/Telemetry data    Radiology Data:   Echo 10/12/2018:  Interpretation Summary     · Estimated EF = 25-30%. Global hypokinesis  · Mild mitral valve regurgitation is present  · Mild tricuspid valve regurgitation is present.  · Calculated right ventricular systolic pressure from tricuspid regurgitation is 37 mmHg.  · Mildly reduced right ventricular systolic function noted.          Current Medications:    aspirin 81 mg Oral Daily   atorvastatin 40 mg Oral Daily   carvedilol 6.25 mg Oral Q12H   citalopram 20 mg Oral Daily   docusate sodium 200 mg Oral Daily   fludrocortisone 0.1 mg Oral Daily   furosemide 40 mg Oral Daily   heparin (porcine) 5,000 Units Subcutaneous Q12H   miconazole  Topical Q12H   pantoprazole 40 mg Oral Daily   sacubitril-valsartan 1 tablet Oral Q12H   sodium chloride 3 mL Intravenous Q12H           Assessment:   1. Acute systolic heart failure with undetermined chronicity, EF 25%  2. History of remote CABG ×5, with acute nonspecific/reported chest pain now resolved and negative cardiac markers  3. History of paroxysmal atrial fibrillation, has previously been on Xarelto which was discontinued due to frequent falls and high perceived risk.  4. Dyslipidemia on statin therapy  5. Poor historian and data deficit  6. Dementia.    Plan:   1. Clinically improved. Continue current medical treatment with ASA, Lipitor, BB and Entresto as well as Lasix 40mg PO  2. From CHF standpoint he is now compensated and his medical therapy has been optimized.  3. From CAD standpoint he is currently free of angina I do not see any benefit in proceeding to ischemic evaluation as this will not change the course of management.  The patient has remained free of chest pain and he is not suitable candidate for high risk invasive testing or further intervention in the setting  of significant dementia.  4. Reasonable to transfer him back to nursing home whenever felt appropriate by the hospitalist service.  We once again suggest that goals of future hospitalizations, treatments etc. should be discussed with the patient's family.  I have seen him for last 3 days and was not able to meet with any family members unfortunately.  Interestingly I had to introduce myself each time as the patient does not seem to remember and states that he forgets a lot.    5. I will sign off, please reconsult if needed.      Abimbola Olivas PA-C.  3:13 PM  10/15/18     I have seen and examined the patient, case was discussed with the physician extender, reviewed the above note, necessary changes were made and I agree with the final note.   Cecy Shirley MD, FACC, [facility]

## 2018-10-15 NOTE — PROGRESS NOTES
[facility] Medicine Services  PROGRESS NOTE    Patient Name: Edwardo Diggs  : 1940  MRN: [id number]    Date of Admission: 10/12/2018  Length of Stay: 0  Primary Care Physician: Provider, No Known    Subjective   Subjective     CC:  A/C CHF    HPI:  No SOA, no edema.  No issues per nursing.       ROS:  Otherwise ROS is negative except as mentioned in the HPI.    Objective   Objective     Vital Signs:   Temp:  [97.5 °F (36.4 °C)-98.1 °F (36.7 °C)] 98.1 °F (36.7 °C)  Heart Rate:  [71-74] 74  Resp:  [18] 18  BP: (128-139)/() 128/87        Physical Exam:  Constitutional: No acute distress, awake, alert, nontoxic, normal body habitus  Respiratory: Clear to auscultation bilaterally without overt crackles but has poor effort, nonlabored  Cardiovascular: RRR, no murmur  Gastrointestinal: Positive bowel sounds, soft, nontender, nondistended  Musculoskeletal: trace ankle edema, normal muscle tone for age  Psychiatric: Friendly affect, good insight and judgement, cooperative  Neurologic: Dementia, movements symmetric BUE and BLE, Cranial Nerves grossly intact to confrontation, speech clear and fluent  Skin: No rashes, no jaundice, no petechiae, no mottling      Results Reviewed:  I have personally reviewed current lab, radiology, and data and agree.      Results from last 7 days  Lab Units 10/12/18  0941 10/12/18  0230   WBC 10*3/mm3 6.83 10.27   HEMOGLOBIN g/dL 12.6* 12.7*   HEMATOCRIT % 41.6 42.2   PLATELETS 10*3/mm3 114* 132*       Results from last 7 days  Lab Units 10/13/18  0422 10/12/18  0941 10/12/18  0230   SODIUM mmol/L 146 147* 145   POTASSIUM mmol/L 3.7 3.8 4.1   CHLORIDE mmol/L 107 108 107   CO2 mmol/L 36.0* 28.0 29.0   BUN mg/dL 18 17 17   CREATININE mg/dL 1.06 1.07 1.16   GLUCOSE mg/dL 88 119* 95   CALCIUM mg/dL 8.9 8.9 9.2   ALT (SGPT) U/L  --   --  18   AST (SGOT) U/L  --   --  17     Estimated Creatinine Clearance: 66 mL/min (by C-G formula based on SCr of 1.06  mg/dL).  BNP   Date Value Ref Range Status   10/13/2018 1,209.0 (H) 0.0 - 100.0 pg/mL Final     Comment:     Results may be falsely decreased if patient taking Biotin.       Results for orders placed during the hospital encounter of 10/12/18   Adult Transthoracic Echo Complete W/ Cont if Necessary Per Protocol    Narrative · Estimated EF = 25-30%. Global hypokinesis  · Mild mitral valve regurgitation is present  · Mild tricuspid valve regurgitation is present.  · Calculated right ventricular systolic pressure from tricuspid   regurgitation is 37 mmHg.  · Mildly reduced right ventricular systolic function noted.          I have reviewed the medications.    Assessment/Plan   Assessment / Plan     Active Hospital Problems    Diagnosis Date Noted   • **Acute combined systolic and diastolic congestive heart failure (CMS/HCC) [I50.41] 10/12/2018   • Dyslipidemia [E78.5] 10/13/2018   • CAD (coronary artery disease) [I25.10] 10/13/2018   • History of cardioembolic cerebrovascular accident (CVA) [Z86.73] 10/13/2018   • Poor historian [Z78.9] 10/13/2018   • Chronic respiratory failure with hypoxia (CMS/[facility]) [J96.11] 10/13/2018   • Hx of CABG [Z95.1] 10/13/2018   • Dyspnea [R06.00] 10/12/2018   • COPD exacerbation (CMS/[facility]) [J44.1] 10/12/2018   • Anemia [D64.9] 10/12/2018   • Thrombocytopenia (CMS/[facility]) [D69.6] 10/12/2018   • Respiratory distress [R06.03] 10/12/2018      Resolved Hospital Problems    Diagnosis Date Noted Date Resolved   No resolved problems to display.          Brief Hospital Course to date:  Edwadro Diggs is a 78 y.o. male with PMHx of combined CHF, COPD on chronic 2 Liters O2 qhs prn presented to [facility] ED c/o increasing shortness of breath and increased O2 requirement w/ chest discomfort.    Assessment and Plan:  Please see above Hospital Problem List which is being actively managed to reflect all current/pertinent diagnoses, the following items may only represent today's acute or active assessments and/or plans  of care.    A/C systolic and diastolic CHF  - s/p IV diuresis, now converted to PO Lasix (have increased home dose from 20mg to 40mg)  - EF 25%     CAD hx of CABG x5  - BB and ASA added, continue home dose statin   - Cardiology cancelled Cardiac PET as would not change POC, plan is to maxiamally med manage his CHF and CAD    COPD  Chronic hypoxic failure, 2L baseline  Hx of CVA  Dementia  - stable, chronic    DVT Prophylaxis:  SC hep    CODE STATUS:   Code Status and Medical Interventions:   Ordered at: 10/12/18 0536     Level Of Support Discussed With:    Patient     Code Status:    CPR     Medical Interventions (Level of Support Prior to Arrest):    Full       Disposition: I expect the patient to be discharged back to [facility] tomorrow.      Electronically signed by Santa Perez MD, 10/15/18, 7:58 PM.

## 2018-10-15 NOTE — THERAPY EVALUATION
Acute Care - Physical Therapy Initial Evaluation  [facility]     Patient Name: Edwardo Diggs  : 1940  MRN: [id number]  Today's Date: 10/15/2018   Onset of Illness/Injury or Date of Surgery: 10/12/18  Date of Referral to PT: 10/13/18  Referring Physician: MARCOS Cartwright      Admit Date: 10/12/2018    Visit Dx:     ICD-10-CM ICD-9-CM   1. Respiratory distress R06.03 786.09   2. COPD with acute exacerbation (CMS/HCC) J44.1 491.21   3. Acute on chronic systolic congestive heart failure (CMS/HCC) I50.23 428.23     428.0   4. Impaired functional mobility, balance, gait, and endurance Z74.09 V49.89     Patient Active Problem List   Diagnosis   • Dyspnea   • Acute combined systolic and diastolic congestive heart failure (CMS/HCC)   • COPD exacerbation (CMS/HCC)   • Anemia   • Thrombocytopenia (CMS/HCC)   • Respiratory distress   • Dyslipidemia   • CAD (coronary artery disease)   • History of cardioembolic cerebrovascular accident (CVA)   • Poor historian   • Chronic respiratory failure with hypoxia (CMS/HCC)   • Hx of CABG     Past Medical History:   Diagnosis Date   • Anemia    • Constipation    • Depression    • Dermatitis    • GERD (gastroesophageal reflux disease)    • Hyperlipidemia    • Orthostatic hypotension    • Parkinson's disease (CMS/HCC)    • Tinea corporis      Past Surgical History:   Procedure Laterality Date   • CATARACT EXTRACTION     • CORONARY ARTERY BYPASS GRAFT          PT ASSESSMENT (last 12 hours)      Physical Therapy Evaluation     Row Name 10/15/18 1055          PT Evaluation Time/Intention    Subjective Information complains of;dizziness  -LM     Document Type evaluation  -LM     Mode of Treatment individual therapy;physical therapy  -LM     Patient Effort adequate  -LM     Symptoms Noted During/After Treatment dizziness  -LM     Comment Upon standing at EOB, pt reports he became extremely dizzy and needed to lay back down.  When asked how long this has been going on, pt reports  "\"forever.\"  States this is why he falls at home.  RN notified.  -LM     Row Name 10/15/18 1055          General Information    Patient Profile Reviewed? yes  -LM     Onset of Illness/Injury or Date of Surgery 10/12/18  -LM     Referring Physician MARCOS Cartwright  -LM     Patient Observations cooperative;agree to therapy  -LM     Patient/Family Observations No family present.  -LM     General Observations of Patient Pt lying in bed.  Noted to have O2.  Pleasant and agreeable to PT eval.  -LM     Prior Level of Function independent:;all household mobility;gait;min assist:;ADL's   Initially states (I), later states son helps w/ some things  -LM     Equipment Currently Used at Home rollator;oxygen  -LM     Pertinent History of Current Functional Problem Complaints of SOA and cough.  Dx: CHF  -LM     Existing Precautions/Restrictions fall;oxygen therapy device and L/min  -LM     Risks Reviewed patient:;LOB;increased discomfort  -LM     Benefits Reviewed patient:;improve function;increase independence;increase strength;increase balance  -LM     Barriers to Rehab none identified  -     Row Name 10/15/18 1055          Relationship/Environment    Lives With facility resident   Valdo Shepherd  -     Row Name 10/15/18 1055          Resource/Environmental Concerns    Current Living Arrangements extended care facility  -     Row Name 10/15/18 1055          Cognitive Assessment/Interventions    Additional Documentation Cognitive Assessment/Intervention (Group)  -     Row Name 10/15/18 1051          Cognitive Assessment/Intervention- PT/OT    Affect/Mental Status (Cognitive) confused   Intermittent  -LM     Orientation Status (Cognition) oriented to;person;place  -LM     Follows Commands (Cognition) follows one step commands;over 90% accuracy;verbal cues/prompting required  -LM     Cognitive Function (Cognitive) safety deficit  -LM     Safety Deficit (Cognitive) mild deficit;safety precautions awareness  -LM     " Personal Safety Interventions fall prevention program maintained;gait belt;muscle strengthening facilitated;nonskid shoes/slippers when out of bed  -     Row Name 10/15/18 1055          Bed Mobility Assessment/Treatment    Bed Mobility Assessment/Treatment supine-sit  -LM     Supine-Sit [place] (Bed Mobility) minimum assist (75% patient effort)  -LM     Assistive Device (Bed Mobility) bed rails;head of bed elevated  -[facility] Name 10/15/18 1055          Transfer Assessment/Treatment    Transfer Assessment/Treatment sit-stand transfer;stand-sit transfer  -LM     Sit-Stand [place] (Transfers) minimum assist (75% patient effort)  -LM     Stand-Sit [place] (Transfers) contact guard  -[facility] Name 10/15/18 1055          General ROM    GENERAL ROM COMMENTS BLE AROM WFL  -[facility] Name 10/15/18 1055          MMT (Manual Muscle Testing)    General MMT Comments BLEs grossly 4/5 throughout  -LM     Row Name 10/15/18 1055          Sensory Assessment/Intervention    Sensory General Assessment no sensation deficits identified  -LM     Row Name 10/15/18 1055          Pain Assessment    Additional Documentation Pain Scale: Numbers Pre/Post-Treatment (Group)  -LM     Row Name 10/15/18 1055          Pain Scale: Numbers Pre/Post-Treatment    Pain Scale: Numbers, Pretreatment 0/10 - no pain  -LM     Pain Scale: Numbers, Post-Treatment 0/10 - no pain  -LM     Row Name             Wound 10/12/18 0236 Left elbow skin tear    Wound - Properties Group Date first assessed: 10/12/18  -HS Time first assessed: 0236  -HS Present On Admission : yes  -HS Side: Left  -HS Location: elbow  -HS Type: skin tear  -HS    Row Name             Wound 10/12/18 0630 Bilateral coccyx pressure injury    Wound - Properties Group Date first assessed: 10/12/18  -BC Time first assessed: 0630  -BC Side: Bilateral  -BC Location: coccyx  -BC Type: pressure injury  -BC Stage, Pressure Injury: Stage 2  -BC    Row Name             Wound 10/12/18  0630 Bilateral groin MASD (moisture associated skin damage)    Wound - Properties Group Date first assessed: 10/12/18  -BC Time first assessed: 0630  -BC Side: Bilateral  -BC Location: groin  -BC Type: MASD (moisture associated skin damage)  -BC    Row Name 10/15/18 1055          Plan of Care Review    Plan of Care Reviewed With patient  -LM     Row Name 10/15/18 1055          Physical Therapy Clinical Impression    Date of Referral to PT 10/13/18  -LM     PT Diagnosis (PT Clinical Impression) Impaired functional mobility, balance, gait, and endurance  -LM     Criteria for Skilled Interventions Met (PT Clinical Impression) yes;treatment indicated  -LM     Rehab Potential (PT Clinical Summary) good, to achieve stated therapy goals  -LM     Care Plan Review (PT) evaluation/treatment results reviewed;care plan/treatment goals reviewed;risks/benefits reviewed;current/potential barriers reviewed;patient/other agree to care plan  -LM     Row Name 10/15/18 1055          Vital Signs    Pre Systolic BP Rehab 119  -LM     Pre Treatment Diastolic BP 72  -LM     Pretreatment Heart Rate (beats/min) 84  -LM     Pre Patient Position Supine  -LM     Intra Patient Position Standing  -LM     Post Patient Position Supine  -LM     Row Name 10/15/18 1053          Physical Therapy Goals    Bed Mobility Goal Selection (PT) bed mobility, PT goal 1  -LM     Transfer Goal Selection (PT) transfer, PT goal 1  -LM     Gait Training Goal Selection (PT) gait training, PT goal 1  -LM     Row Name 10/15/18 1054          Bed Mobility Goal 1 (PT)    Activity/Assistive Device (Bed Mobility Goal 1, PT) sit to supine/supine to sit  -LM     [place] Level/Cues Needed (Bed Mobility Goal 1, PT) supervision required  -LM     Time Frame (Bed Mobility Goal 1, PT) long term goal (LTG);2 weeks  -LM     Row Name 10/15/18 1052          Transfer Goal 1 (PT)    Activity/Assistive Device (Transfer Goal 1, PT) bed-to-chair/chair-to-bed  -LM     [place]  Level/Cues Needed (Transfer Goal 1, PT) supervision required  -LM     Time Frame (Transfer Goal 1, PT) long term goal (LTG);2 weeks  -LM     Row Name 10/15/18 1055          Gait Training Goal 1 (PT)    Activity/Assistive Device (Gait Training Goal 1, PT) gait (walking locomotion);assistive device use  -LM     [place] Level (Gait Training Goal 1, PT) supervision required  -LM     Distance (Gait Goal 1, PT) 150 feet  -LM     Time Frame (Gait Training Goal 1, PT) long term goal (LTG);2 weeks  -LM     Row Name 10/15/18 1054          Positioning and Restraints    Pre-Treatment Position in bed  -LM     Post Treatment Position bed  -LM     In Bed supine;call light within reach;encouraged to call for assist;exit alarm on;notified nsg  -LM     Row Name 10/15/18 1051          Living Environment    Home Accessibility --   No concerns  -LM       User Key  (r) = Recorded By, (t) = Taken By, (c) = Cosigned By    Initials Name Provider Type    LM Zulma Ashley, PT Physical Therapist    Alida Rodríguez, RN Registered Nurse     Santa Haile RN Registered Nurse          Physical Therapy Education     Title: PT OT SLP Therapies (Active)     Topic: Physical Therapy (Active)     Point: Mobility training (Active)    Learning Progress Summary     Learner Status Readiness Method Response Comment Documented by    Patient Active Acceptance E NR  LM 10/15/18 1307          Point: Precautions (Active)    Learning Progress Summary     Learner Status Readiness Method Response Comment Documented by    Patient Active Acceptance E NR  LM 10/15/18 1307                      User Key     Initials Effective Dates Name Provider Type Discipline    LM 06/15/16 -  Zulma Ashley, PT Physical Therapist PT                PT Recommendation and Plan  Anticipated Discharge Disposition (PT): skilled nursing facility  Planned Therapy Interventions (PT Eval): bed mobility training, balance training, gait training, home exercise program, neuromuscular  re-education, patient/family education, postural re-education, ROM (range of motion), strengthening, stretching, transfer training  Therapy Frequency (PT Clinical Impression): daily  Outcome Summary/Treatment Plan (PT)  Anticipated Discharge Disposition (PT): skilled nursing facility  Plan of Care Reviewed With: patient  Outcome Summary: PT evaluation completed on this date.  Pt transferred supine-->sit Pavel, stood with Pavel, and returned to supine with SBA.  Eval limited 2* to when pt stood, he reported dizziness.  Skilled PT needed to improve mobility and safety prior to d/c.  Recommend d/c back to SNF.          Outcome Measures     Row Name 10/15/18 1055             How much help from another person do you currently need...    Turning from your back to your side while in flat bed without using bedrails? 3  -LM      Moving from lying on back to sitting on the side of a flat bed without bedrails? 3  -LM      Moving to and from a bed to a chair (including a wheelchair)? 3  -LM      Standing up from a chair using your arms (e.g., wheelchair, bedside chair)? 3  -LM      Climbing 3-5 steps with a railing? 2  -LM      To walk in hospital room? 3  -LM      AM-PAC 6 Clicks Score 17  -LM         Functional Assessment    Outcome Measure Options AM-PAC 6 Clicks Basic Mobility (PT)  -LM        User Key  (r) = Recorded By, (t) = Taken By, (c) = Cosigned By    Initials Name Provider Type    LM Zulma Ashley PT Physical Therapist           Time Calculation:         PT Charges     Row Name 10/15/18 1055             Time Calculation    Start Time 1055  -LM      PT Received On 10/15/18  -LM      PT Goal Re-Cert Due Date 10/25/18  -LM        User Key  (r) = Recorded By, (t) = Taken By, (c) = Cosigned By    Initials Name Provider Type    Zulma Howard PT Physical Therapist        Therapy Suggested Charges     Code   Minutes Charges    None           Therapy Charges for Today     Code Description Service Date Service Provider  Modifiers Qty    16557626095 HC PT EVAL MOD COMPLEXITY 4 10/15/2018 Zulma Ashley, PT GP 1    26039854764 HC PT MOBILITY CURRENT 10/15/2018 Zulma Ashley, PT GP, CK 1    74184262322 HC PT MOBILITY PROJECTED 10/15/2018 Zulma Ashley, PT GP, CJ 1          PT G-Codes  PT Professional Judgement Used?: Yes  Outcome Measure Options: AM-PAC 6 Clicks Basic Mobility (PT)  AM-PAC 6 Clicks Score: 17  Functional Limitation: Mobility: Walking and moving around  Mobility: Walking and Moving Around Current Status (): At least 40 percent but less than 60 percent impaired, limited or restricted  Mobility: Walking and Moving Around Goal Status (): At least 20 percent but less than 40 percent impaired, limited or restricted      Zulma Ashley, PT  10/15/2018

## 2018-10-15 NOTE — PROGRESS NOTES
Continued Stay Note  [facility]     Patient Name: Edwardo Diggs  MRN: [id number]  Today's Date: 10/15/2018    Admit Date: 10/12/2018          Discharge Plan     Row Name 10/15/18 1126       Plan    Plan return to [facility]    Patient/Family in Agreement with Plan yes    Plan Comments Pt. will have his cardiac PET scan tomorrow, 10/16/18.  Per hospitalist, pt. will likely be ready to return to [facility] on Wednesday, 10/17/18.  Pt's son will provide transportation.  RN stated pt. is capable of getting in/out of a car.  SW will continue to follow for discharge planning.    Final Discharge Disposition Code 04 - intermediate care facility              Discharge Codes    No documentation.           TATO Campos

## 2018-10-16 VITALS
HEIGHT: 70 IN | DIASTOLIC BLOOD PRESSURE: 94 MMHG | RESPIRATION RATE: 18 BRPM | TEMPERATURE: 98.1 F | SYSTOLIC BLOOD PRESSURE: 140 MMHG | BODY MASS INDEX: 26.34 KG/M2 | OXYGEN SATURATION: 94 % | HEART RATE: 93 BPM | WEIGHT: 184 LBS

## 2018-10-16 PROBLEM — J44.1 COPD EXACERBATION (HCC): Status: RESOLVED | Noted: 2018-10-12 | Resolved: 2018-10-16

## 2018-10-16 PROBLEM — R06.03 RESPIRATORY DISTRESS: Status: RESOLVED | Noted: 2018-10-12 | Resolved: 2018-10-16

## 2018-10-16 PROBLEM — I50.41 ACUTE COMBINED SYSTOLIC AND DIASTOLIC CONGESTIVE HEART FAILURE (HCC): Status: RESOLVED | Noted: 2018-10-12 | Resolved: 2018-10-16

## 2018-10-16 PROBLEM — D69.6 THROMBOCYTOPENIA (HCC): Status: RESOLVED | Noted: 2018-10-12 | Resolved: 2018-10-16

## 2018-10-16 PROBLEM — I50.22 CHRONIC SYSTOLIC CHF (CONGESTIVE HEART FAILURE) (HCC): Status: ACTIVE | Noted: 2018-10-16

## 2018-10-16 PROBLEM — R06.00 DYSPNEA: Status: RESOLVED | Noted: 2018-10-12 | Resolved: 2018-10-16

## 2018-10-16 PROCEDURE — G0378 HOSPITAL OBSERVATION PER HR: HCPCS

## 2018-10-16 PROCEDURE — 96372 THER/PROPH/DIAG INJ SC/IM: CPT

## 2018-10-16 PROCEDURE — 25010000002 HEPARIN (PORCINE) PER 1000 UNITS: Performed by: NURSE PRACTITIONER

## 2018-10-16 PROCEDURE — 99217 PR OBSERVATION CARE DISCHARGE MANAGEMENT: CPT | Performed by: PHYSICIAN ASSISTANT

## 2018-10-16 RX ORDER — ASPIRIN 81 MG/1
81 TABLET ORAL DAILY
Start: 2018-10-17

## 2018-10-16 RX ORDER — ACETAMINOPHEN 325 MG/1
650 TABLET ORAL EVERY 4 HOURS PRN
Start: 2018-10-16

## 2018-10-16 RX ORDER — CARVEDILOL 6.25 MG/1
6.25 TABLET ORAL EVERY 12 HOURS SCHEDULED
Start: 2018-10-16

## 2018-10-16 RX ORDER — FUROSEMIDE 40 MG/1
40 TABLET ORAL DAILY
Start: 2018-10-17

## 2018-10-16 RX ADMIN — SACUBITRIL AND VALSARTAN 1 TABLET: 24; 26 TABLET, FILM COATED ORAL at 08:16

## 2018-10-16 RX ADMIN — PANTOPRAZOLE SODIUM 40 MG: 40 TABLET, DELAYED RELEASE ORAL at 05:45

## 2018-10-16 RX ADMIN — DOCUSATE SODIUM 200 MG: 100 CAPSULE, LIQUID FILLED ORAL at 08:16

## 2018-10-16 RX ADMIN — CITALOPRAM HYDROBROMIDE 20 MG: 20 TABLET ORAL at 08:16

## 2018-10-16 RX ADMIN — FLUDROCORTISONE ACETATE 0.1 MG: 0.1 TABLET ORAL at 08:16

## 2018-10-16 RX ADMIN — CARVEDILOL 6.25 MG: 6.25 TABLET, FILM COATED ORAL at 08:16

## 2018-10-16 RX ADMIN — ATORVASTATIN CALCIUM 40 MG: 40 TABLET, FILM COATED ORAL at 08:16

## 2018-10-16 RX ADMIN — HEPARIN SODIUM 5000 UNITS: 5000 INJECTION, SOLUTION INTRAVENOUS; SUBCUTANEOUS at 08:16

## 2018-10-16 RX ADMIN — MICONAZOLE NITRATE: 2 CREAM TOPICAL at 08:30

## 2018-10-16 RX ADMIN — FUROSEMIDE 40 MG: 40 TABLET ORAL at 08:16

## 2018-10-16 RX ADMIN — ASPIRIN 81 MG: 81 TABLET, COATED ORAL at 08:16

## 2018-10-16 NOTE — PROGRESS NOTES
Case Management Discharge Note    Final Note: REYNA spoke to both pt's son and pt. today regarding discharge planning. Pt. has been discharged and will return to [facility].  Pt's son will provide transportation in his personal vehicle.  Pt's son does not have portable O2 available, so a portable O2 tank will be provided by [employer] to use during transport only as pt. receives home O2 at [facility].  Hospitalist and RN will need to call report to [phone].  REYNA has faxed a discharge summary/packet to [facility].  RN, please send discharge packet and any hard scripts with pt. at discharge.  Pt. and son do not report any further discharge needs.      Destination - Selection Complete     Service Request Status Selected Specialties Address Phone Number Fax Number    [facility] [address] [place] [address] [phone]      Durable Medical Equipment - Selection Complete     Service Request Status Selected Specialties Address Phone Number Fax Number    [facility] Selected D[facility] Services [address] [place] [id number] [phone]      Dialysis/Infusion     No service has been selected for the patient.      Home Medical Care     No service has been selected for the patient.      Social Care     No service has been selected for the patient.             Final Discharge Disposition Code: 04 - intermediate care facility

## 2018-10-16 NOTE — DISCHARGE SUMMARY
[facility] Medicine Services  DISCHARGE SUMMARY    Patient Name: Edwardo Diggs  : 1940  MRN: [id number]    Date of Admission: 10/12/2018  Date of Discharge:  10/16/2018  Primary Care Physician: Provider, No Known    Consults     Date and Time Order Name Status Description    10/12/2018 1705 Inpatient Cardiology Consult Completed         Hospital Course     Presenting Problem:   Respiratory distress [R06.03]    Active Hospital Problems    Diagnosis Date Noted   • Chronic systolic CHF (congestive heart failure) (CMS/HCC) [I50.22] 10/16/2018   • Dyslipidemia [E78.5] 10/13/2018   • CAD (coronary artery disease) [I25.10] 10/13/2018   • History of cardioembolic cerebrovascular accident (CVA) [Z86.73] 10/13/2018   • Poor historian [Z78.9] 10/13/2018   • Chronic respiratory failure with hypoxia (CMS/HCC) [J96.11] 10/13/2018   • Hx of CABG [Z95.1] 10/13/2018   • Anemia [D64.9] 10/12/2018      Resolved Hospital Problems    Diagnosis Date Noted Date Resolved   • **Acute combined systolic and diastolic congestive heart failure (CMS/HCC) [I50.41] 10/12/2018 10/16/2018   • Dyspnea [R06.00] 10/12/2018 10/16/2018   • COPD exacerbation (CMS/HCC) [J44.1] 10/12/2018 10/16/2018   • Thrombocytopenia (CMS/HCC) [D69.6] 10/12/2018 10/16/2018   • Respiratory distress [R06.03] 10/12/2018 10/16/2018      Hospital Course:  Mr. Edwardo Diggs is an 77yo male with PMH significant for dementia, HTN, hyperlipidemia, CAD (s/p CABG x 5), chronic systolic heart failure and COPD on 2L NC at all times. He is a resident of [facility]. He was brought to [facility] ED on 10/12/18 for evaluation of dyspnea with increasing O2 requirements and chest discomfort without radiation. He noted orthopnea, paroxsymal nocturnal dyspnea and leg swelling as well. BNP in the ED 1200. Labs otherwise unremarkable.     He was given IV lasix and Solumedrol in the ED and admitted to the hospital medicine service. IV diuresis  was continued. Cardiology was consulted and Dr. Shirley saw the patient. He was started on Coreg and Entresto. ECHO revealed EF 25-30% with RVSP 37 mmHg and mild valvular heart disease (mitral and tricuspid). Cardiac PET scan was initially recommended but ultimately, deferred because results would not change overall course of care given the patient's age and his dementia. Mr. Diggs is improved and he will return to [facility] on 10/16/18.     Day of Discharge     HPI:   Laying in bed, wants to be repositioned. No chest pain, dyspnea, nausea/vomiting or diarrhea. No new issues per nursing. He is stable on baseline 2L NC.     Review of Systems  Gen- No fevers, chills  CV- No chest pain, palpitations  Resp- No cough, dyspnea  GI- No N/V/D, abd pain    Otherwise ROS is negative except as mentioned in the HPI.    Vital Signs:   Temp:  [98.1 °F (36.7 °C)] 98.1 °F (36.7 °C)  Heart Rate:  [74-93] 93  Resp:  [18] 18  BP: (128-140)/(87-94) 140/94     Physical Exam:  Constitutional: No acute distress, awake, alert  HENT: NCAT, mucous membranes moist  Respiratory: Clear to auscultation bilaterally, respiratory effort normal   Cardiovascular: RRR, no murmurs, rubs, or gallops, palpable pedal pulses bilaterally  Gastrointestinal: Positive bowel sounds, soft, nontender, nondistended  Musculoskeletal: No bilateral ankle edema  Psychiatric: Appropriate affect, cooperative  Neurologic: Oriented to self, strength symmetric in all extremities, Cranial Nerves grossly intact to confrontation, speech clear  Skin: No rashes    Pertinent  and/or Most Recent Results       Results from last 7 days  Lab Units 10/13/18  0422 10/12/18  0941 10/12/18  0230   WBC 10*3/mm3  --  6.83 10.27   HEMOGLOBIN g/dL  --  12.6* 12.7*   HEMATOCRIT %  --  41.6 42.2   PLATELETS 10*3/mm3  --  114* 132*   SODIUM mmol/L 146 147* 145   POTASSIUM mmol/L 3.7 3.8 4.1   CHLORIDE mmol/L 107 108 107   CO2 mmol/L 36.0* 28.0 29.0   BUN mg/dL 18 17 17   CREATININE mg/dL  1.06 1.07 1.16   GLUCOSE mg/dL 88 119* 95   CALCIUM mg/dL 8.9 8.9 9.2       Results from last 7 days  Lab Units 10/12/18  0230   BILIRUBIN mg/dL 1.2   ALK PHOS U/L 100   ALT (SGPT) U/L 18   AST (SGOT) U/L 17       Results from last 7 days  Lab Units 10/13/18  0422 10/13/18  0421 10/12/18  0230   TSH mIU/mL 2.162  --   --    HEMOGLOBIN A1C % 5.70*  --   --    BNP pg/mL  --  1,209.0* 2,091.0*     Brief Urine Lab Results     None        Microbiology Results Abnormal     None        Imaging Results (all)     Procedure Component Value Units Date/Time    XR Chest 1 View [[id number]] Collected:  10/12/18 0209     Updated:  10/12/18 0308    Narrative:       EXAM:  XR Chest, 1 View    CLINICAL HISTORY:  78 years old, male; Pain; Chest pain; Left-sided chest pain; Additional info:   Chest pain/dyspnea    TECHNIQUE:  Frontal view of the chest.    COMPARISON:  No relevant prior studies available.    FINDINGS:  Lungs:  Left basilar opacity, likely atelectasis, although left lower   lobe/retrocardiac pneumonia possible.  Pleural space:  Small right pleural effusion with associated right basilar   atelectasis.  No pneumothorax.  Heart:  Cardiac silhouette is enlarged, compatible with cardiomegaly and/or   pericardial fluid.  Mediastinum:  Normal.  Bones/joints:  Changes of prior sternotomy.  Multilevel thoracic spine   degenerative changes.  Vasculature:  Atherosclerotic ossification of the thoracic aorta.      Impression:       1.  Small right pleural effusion with associated right basilar atelectasis.    2.  Left basilar opacity, likely atelectasis, although left lower   lobe/retrocardiac pneumonia possible.  Recommend followup.    3.  Incidental/non-acute findings are described above.    THIS DOCUMENT HAS BEEN ELECTRONICALLY SIGNED BY ALEXSANDER MORENO MD        Results for orders placed during the hospital encounter of 10/12/18   Adult Transthoracic Echo Complete W/ Cont if Necessary Per Protocol    Narrative · Estimated EF =  25-30%. Global hypokinesis  · Mild mitral valve regurgitation is present  · Mild tricuspid valve regurgitation is present.  · Calculated right ventricular systolic pressure from tricuspid   regurgitation is 37 mmHg.  · Mildly reduced right ventricular systolic function noted.        Discharge Details        Discharge Medications      New Medications      Instructions Start Date   acetaminophen 325 MG tablet  Commonly known as:  TYLENOL   650 mg, Oral, Every 4 Hours PRN      aspirin 81 MG EC tablet   81 mg, Oral, Daily      carvedilol 6.25 MG tablet  Commonly known as:  COREG   6.25 mg, Oral, Every 12 Hours Scheduled      miconazole 2 % cream  Commonly known as:  MICOTIN   Apply to bilateral groin twice daily      sacubitril-valsartan 24-26 MG tablet  Commonly known as:  ENTRESTO   1 tablet, Oral, Every 12 Hours Scheduled         Changes to Medications      Instructions Start Date   furosemide 40 MG tablet  Commonly known as:  LASIX  What changed:  · medication strength  · how much to take   40 mg, Oral, Daily         Continue These Medications      Instructions Start Date   atorvastatin 40 MG tablet  Commonly known as:  LIPITOR   40 mg, Oral, Daily      citalopram 20 MG tablet  Commonly known as:  CeleXA   20 mg, Oral, Daily      DOCUSATE SODIUM PO   200 mg, Oral, Daily      fludrocortisone 0.1 MG tablet   0.1 mg, Oral, Daily      pantoprazole 40 MG EC tablet  Commonly known as:  PROTONIX   40 mg, Oral, Daily      Propylene Glycol 0.6 % solution   0.6 %, Ophthalmic, 3 Times Daily           Discharge Disposition:  Home or Self Care    Discharge Diet:  Diet Instructions     Diet: Regular, Cardiac; Thin       Discharge Diet:   Regular  Cardiac       Fluid Consistency:  Thin        Discharge Activity:   Activity Instructions     Activity as Tolerated           Code Status/Level of Support:  Code Status and Medical Interventions:   Ordered at: 10/12/18 0551     Level Of Support Discussed With:    Patient     Code Status:     CPR     Medical Interventions (Level of Support Prior to Arrest):    Full     No future appointments.    Time Spent on Discharge:  40 minutes    Electronically signed by Monie Lopez PA-C, 10/16/18, 12:21 PM.

## 2018-10-16 NOTE — PLAN OF CARE
Problem: Patient Care Overview  Goal: Discharge Needs Assessment  Outcome: Ongoing (interventions implemented as appropriate)   10/12/18 1517 10/13/18 0445 10/13/18 1516   Discharge Needs Assessment   Readmission Within the Last 30 Days --  no previous admission in last 30 days --    Concerns to be Addressed --  --  basic needs;discharge planning   Patient/Family Anticipates Transition to --  --  long term care facility   Patient/Family Anticipated Services at Transition --  --  ;skilled nursing   Transportation Concerns --  --  car, none   Transportation Anticipated --  --  family or friend will provide   Equipment Needed After Discharge --  --  none   Outpatient/Agency/Support Group Needs --  --  skilled nursing facility   Discharge Facility/Level of Care Needs --  --  nursing facility, skilled   Current Discharge Risk --  --  chronically ill;cognitively impaired;dependent with mobility/activities of daily living   Discharge Coordination/Progress --  --  Patient rested well most of the day. Cardiology consulted, continue to diurese, patient voiding extremely well. Confused at times, pleasantly. Skin issues to kody area and coccyx addresed by Wound care, medications ordered and low air loss bed ordered.    Disability   Equipment Currently Used at Home rollator;oxygen --  --      Goal: Interprofessional Rounds/Family Conf  Outcome: Ongoing (interventions implemented as appropriate)   10/13/18 1516   Interdisciplinary Rounds/Family Conf   Participants nursing       Problem: Fall Risk (Adult)  Goal: Absence of Fall  Outcome: Ongoing (interventions implemented as appropriate)   10/13/18 1516   Fall Risk (Adult)   Absence of Fall making progress toward outcome       Problem: Skin Injury Risk (Adult)  Goal: Skin Health and Integrity  Outcome: Ongoing (interventions implemented as appropriate)   10/13/18 1516   Skin Injury Risk (Adult)   Skin Health and Integrity making progress toward outcome         
Problem: Patient Care Overview  Goal: Plan of Care Review  Outcome: Ongoing (interventions implemented as appropriate)    Goal: Individualization and Mutuality  Outcome: Ongoing (interventions implemented as appropriate)    Goal: Discharge Needs Assessment  Outcome: Ongoing (interventions implemented as appropriate)      Problem: Fall Risk (Adult)  Goal: Absence of Fall  Outcome: Ongoing (interventions implemented as appropriate)      Problem: Skin Injury Risk (Adult)  Goal: Skin Health and Integrity  Outcome: Ongoing (interventions implemented as appropriate)        
Problem: Patient Care Overview  Goal: Plan of Care Review  Outcome: Ongoing (interventions implemented as appropriate)   10/12/18 0654   Coping/Psychosocial   Plan of Care Reviewed With patient   Plan of Care Review   Progress no change   OTHER   Outcome Summary VSS, resting at this time, admission completed, no other issues/concerns     Goal: Discharge Needs Assessment  Outcome: Ongoing (interventions implemented as appropriate)      Problem: Fall Risk (Adult)  Goal: Identify Related Risk Factors and Signs and Symptoms  Outcome: Outcome(s) achieved Date Met: 10/12/18    Goal: Absence of Fall  Outcome: Ongoing (interventions implemented as appropriate)      Problem: Skin Injury Risk (Adult)  Goal: Identify Related Risk Factors and Signs and Symptoms  Outcome: Outcome(s) achieved Date Met: 10/12/18    Goal: Skin Health and Integrity  Outcome: Ongoing (interventions implemented as appropriate)        
Problem: Patient Care Overview  Goal: Plan of Care Review  Outcome: Ongoing (interventions implemented as appropriate)   10/12/18 1651 10/12/18 2015 10/13/18 0445   Coping/Psychosocial   Plan of Care Reviewed With --  patient --    Plan of Care Review   Progress no change --  --    OTHER   Outcome Summary --  --  VSS, pt yelling out numerous times c/o stomach ache, and hitting call light incessantly, relaxation music used and pt rested well rest of night, pt with 2 large incontinent episodes, no other issues/concerns     Goal: Discharge Needs Assessment  Outcome: Ongoing (interventions implemented as appropriate)      Problem: Fall Risk (Adult)  Goal: Absence of Fall  Outcome: Ongoing (interventions implemented as appropriate)      Problem: Skin Injury Risk (Adult)  Goal: Skin Health and Integrity  Outcome: Ongoing (interventions implemented as appropriate)        
Problem: Patient Care Overview  Goal: Plan of Care Review  Outcome: Ongoing (interventions implemented as appropriate)   10/12/18 7512   Coping/Psychosocial   Plan of Care Reviewed With patient   Plan of Care Review   Progress no change         
Problem: Patient Care Overview  Goal: Plan of Care Review  Outcome: Ongoing (interventions implemented as appropriate)   10/13/18 2000 10/14/18 0509   Coping/Psychosocial   Plan of Care Reviewed With patient --    Plan of Care Review   Progress --  no change   OTHER   Outcome Summary --  VSS, NIKOLE bed arrived pt transferred to it, creams applied per WOC, condom caths non effective for i and o monitoring, pt continues with large intcontinent episodes, no other issues/concerns     Goal: Discharge Needs Assessment  Outcome: Ongoing (interventions implemented as appropriate)      Problem: Fall Risk (Adult)  Goal: Absence of Fall  Outcome: Ongoing (interventions implemented as appropriate)      Problem: Skin Injury Risk (Adult)  Goal: Skin Health and Integrity  Outcome: Ongoing (interventions implemented as appropriate)        
Problem: Patient Care Overview  Goal: Plan of Care Review  Outcome: Ongoing (interventions implemented as appropriate)   10/13/18 4366   Coping/Psychosocial   Plan of Care Reviewed With patient   Plan of Care Review   Progress no change   OTHER   Outcome Summary Patient presents with bilateral groin fungal rash, groin and perineum MASD, and coccyx stage 2 PI with periwound friction POA. Will order MICOTIN cream BID for fungal rash. Use barrier cream BID for bottom and stage 2 PI. Dry flow pads in place. All skin and pressure interventions in place per RN. Will order NIKOLE bed per [facility] for moisture management and pressure redistribution. Please see orders for care needs. WO nurse will continue to follow at this time. Please contact WOC nurse if needs arise. Thanks          
Problem: Patient Care Overview  Goal: Plan of Care Review  Outcome: Ongoing (interventions implemented as appropriate)   10/14/18 1610 10/15/18 0418   Coping/Psychosocial   Plan of Care Reviewed With --  patient   Plan of Care Review   Progress improving --    OTHER   Outcome Summary VSS, patient on speciality bed and antifungal cream applied. Skin monitoring to ensure skin is dry and intake/output charted, IV diueretics changed to PO in AM. No complaints of breakthru pain at rest. Will continue to monitor.  --        Problem: Fall Risk (Adult)  Goal: Absence of Fall  Outcome: Ongoing (interventions implemented as appropriate)   10/15/18 0418   Fall Risk (Adult)   Absence of Fall making progress toward outcome       Problem: Skin Injury Risk (Adult)  Goal: Skin Health and Integrity  Outcome: Ongoing (interventions implemented as appropriate)   10/15/18 0418   Skin Injury Risk (Adult)   Skin Health and Integrity making progress toward outcome         
Problem: Patient Care Overview  Goal: Plan of Care Review  Outcome: Ongoing (interventions implemented as appropriate)   10/15/18 1055 10/15/18 1646   Coping/Psychosocial   Plan of Care Reviewed With patient --    Plan of Care Review   Progress --  no change   OTHER   Outcome Summary --  VSS, pt confused attempted to get out of bed. PET scan discontinued. Possibly back to [facility] tomorrow.       Problem: Fall Risk (Adult)  Goal: Absence of Fall  Outcome: Ongoing (interventions implemented as appropriate)      Problem: Skin Injury Risk (Adult)  Goal: Skin Health and Integrity  Outcome: Ongoing (interventions implemented as appropriate)        
Problem: Patient Care Overview  Goal: Plan of Care Review  Outcome: Ongoing (interventions implemented as appropriate)   10/15/18 6117   Coping/Psychosocial   Plan of Care Reviewed With patient   OTHER   Outcome Summary PT evaluation completed on this date. Pt transferred supine-->sit Pavel, stood with Pavel, and returned to supine with SBA. Eval limited 2* to when pt stood, he reported dizziness. Skilled PT needed to improve mobility and safety prior to d/c. Recommend d/c back to SNF.         
Problem: Patient Care Overview  Goal: Plan of Care Review  Outcome: Ongoing (interventions implemented as appropriate)   10/16/18 040   Coping/Psychosocial   Plan of Care Reviewed With patient   OTHER   Outcome Summary VSS. No complaints. Will continue to monitor.        Problem: Fall Risk (Adult)  Goal: Absence of Fall  Outcome: Ongoing (interventions implemented as appropriate)      Problem: Skin Injury Risk (Adult)  Goal: Skin Health and Integrity  Outcome: Ongoing (interventions implemented as appropriate)        
Yes

## 2018-10-16 NOTE — DISCHARGE PLACEMENT REQUEST
"Trena Diggs  (78 y.o. Male)     To [place]    From Conchita Demian, [phone]      Date of Birth Social Security Number Address Home Phone MRN    1940  [address]  [place] [id number] [phone] [id number]    Jehovah's witness Marital Status          None        Admission Date Admission Type Admitting Provider Attending Provider Department, Room/Bed    10/12/18 Emergency Santa Perez MD Hall, [place], MD [facility] 5G, S562/1    Discharge Date Discharge Disposition Discharge Destination         Home or Self Care              Attending Provider:  Santa Perez MD    Allergies:  Eggs Or Egg-derived Products    Isolation:  None   Infection:  None   Code Status:  CPR    Ht:  177.8 cm (70\")   Wt:  83.5 kg (184 lb)    Admission Cmt:  None   Principal Problem:  Acute combined systolic and diastolic congestive heart failure (CMS/HCC) [I50.41] More...                 Active Insurance as of 10/12/2018     Primary Coverage     Payor Plan Insurance Group Employer/Plan Group    [facility] [place] [place] [id number]     Payor Plan Address Payor Plan Phone Number Effective From Effective To    [address]  1998     [facility] [id number]       Subscriber Name Subscriber Birth Date Member ID       TRENA DIGGS 1940 [id number]                 Emergency Contacts      (Rel.) Home Phone Work Phone Mobile Phone    Antwon Diggs (Son) -- -- [phone]         [address] [address]  Dept. Phone:  [phone]  Dept. Fax:   Date Ordered: Oct 16, 2018         Patient:  Trena Diggs MRN:  [id number]   [address]  [facility] [address] :  1940  SSN:    Phone: [phone] Sex:  M     Weight: 83.5 kg (184 lb)         Ht Readings from Last 1 Encounters:   10/12/18 177.8 cm (70\")         Oxygen Therapy         (Order ID: [id number])    Diagnosis:  Respiratory distress (R06.03 [ICD-10-CM] 786.09 " [ICD-9-CM])  Acute on chronic systolic congestive heart failure (CMS/HCC) (I50.23 [ICD-10-CM] 428.23,428.0 [ICD-9-CM])  COPD exacerbation (CMS/HCC) (J44.1 [ICD-10-CM] 491.21 [ICD-9-CM])   Quantity:  1     Delivery Modality: Nasal Cannula  Liters Per Minute: 2  Duration: Continuous  Equipment:  Oxygen Concentrator &  &  Portable Gaseous Oxygen System & Portable Oxygen Contents Gaseous  Length of Need (99 Months = Lifetime): Other (specify) (patient already has home O2, needs portable tank for transport to [facility])        Authorizing Provider's Phone: [phone]         Verbal Order Mode: Per protocol: cosign required  Authorizing Provider: Monie Lopez PA-C  Authorizing Provider's NPI: [id number]     Order Entered By: Drea Blackman RN 10/16/2018  1:05 PM     Electronically signed by:                     ICU Vital Signs     Row Name 10/16/18 1215 10/16/18 1016 10/16/18 0816 10/16/18 0600 10/16/18 0500       Height and Weight    Weight  --  --  --  -- 83.5 kg (184 lb)    Weight Method  --  --  --  -- Bed scale       Vitals    Pulse  --  -- 93  --  --    BP  --  -- 140/94  --  --       Oxygen Therapy    Device (Oxygen Therapy) nasal cannula nasal cannula nasal cannula nasal cannula  --    Flow (L/min) 2 2 2 2  --    Row Name 10/16/18 0400 10/16/18 0200 10/16/18 0000 10/15/18 2200 10/15/18 2000       Oxygen Therapy    Device (Oxygen Therapy) nasal cannula nasal cannula nasal cannula nasal cannula nasal cannula    Flow (L/min) 2 2 2 2 2    Row Name 10/15/18 1906 10/15/18 1820 10/15/18 1620 10/15/18 1410          Vitals    Temp 98.1 °F (36.7 °C)  --  --  --     Temp src Oral  --  --  --     Pulse 74  --  --  --     Heart Rate Source Monitor  --  --  --     Resp 18  --  --  --     Resp Rate Source Visual  --  --  --     /87  --  --  --        Oxygen Therapy    Device (Oxygen Therapy)  -- nasal cannula nasal cannula nasal cannula     Flow (L/min)  -- 2 2 2            Discharge  Summary      Monie Lopez PA-C at 10/16/2018 12:08 PM              [facility] Medicine Services  DISCHARGE SUMMARY    Patient Name: Edwardo Diggs  : 1940  MRN: [id number]    Date of Admission: 10/12/2018  Date of Discharge:  10/16/2018  Primary Care Physician: Provider, No Known    Consults     Date and Time Order Name Status Description    10/12/2018 1705 Inpatient Cardiology Consult Completed         Hospital Course     Presenting Problem:   Respiratory distress [R06.03]    Active Hospital Problems    Diagnosis Date Noted   • Chronic systolic CHF (congestive heart failure) (CMS/HCC) [I50.22] 10/16/2018   • Dyslipidemia [E78.5] 10/13/2018   • CAD (coronary artery disease) [I25.10] 10/13/2018   • History of cardioembolic cerebrovascular accident (CVA) [Z86.73] 10/13/2018   • Poor historian [Z78.9] 10/13/2018   • Chronic respiratory failure with hypoxia (CMS/HCC) [J96.11] 10/13/2018   • Hx of CABG [Z95.1] 10/13/2018   • Anemia [D64.9] 10/12/2018      Resolved Hospital Problems    Diagnosis Date Noted Date Resolved   • **Acute combined systolic and diastolic congestive heart failure (CMS/HCC) [I50.41] 10/12/2018 10/16/2018   • Dyspnea [R06.00] 10/12/2018 10/16/2018   • COPD exacerbation (CMS/HCC) [J44.1] 10/12/2018 10/16/2018   • Thrombocytopenia (CMS/HCC) [D69.6] 10/12/2018 10/16/2018   • Respiratory distress [R06.03] 10/12/2018 10/16/2018      Hospital Course:  Mr. Edwardo Diggs is an 79yo male with PMH significant for dementia, HTN, hyperlipidemia, CAD (s/p CABG x 5), chronic systolic heart failure and COPD on 2L NC at all times. He is a resident of [facility]. He was brought to [facility] ED on 10/12/18 for evaluation of dyspnea with increasing O2 requirements and chest discomfort without radiation. He noted orthopnea, paroxsymal nocturnal dyspnea and leg swelling as well. BNP in the ED 1200. Labs otherwise unremarkable.     He was given IV lasix and  Solumedrol in the ED and admitted to the hospital medicine service. IV diuresis was continued. Cardiology was consulted and Dr. Shirley saw the patient. He was started on Coreg and Entresto. ECHO revealed EF 25-30% with RVSP 37 mmHg and mild valvular heart disease (mitral and tricuspid). Cardiac PET scan was initially recommended but ultimately, deferred because results would not change overall course of care given the patient's age and his dementia. Mr. Diggs is improved and he will return to [facility] on 10/16/18.     Day of Discharge     HPI:   Laying in bed, wants to be repositioned. No chest pain, dyspnea, nausea/vomiting or diarrhea. No new issues per nursing. He is stable on baseline 2L NC.     Review of Systems  Gen- No fevers, chills  CV- No chest pain, palpitations  Resp- No cough, dyspnea  GI- No N/V/D, abd pain    Otherwise ROS is negative except as mentioned in the HPI.    Vital Signs:   Temp:  [98.1 °F (36.7 °C)] 98.1 °F (36.7 °C)  Heart Rate:  [74-93] 93  Resp:  [18] 18  BP: (128-140)/(87-94) 140/94     Physical Exam:  Constitutional: No acute distress, awake, alert  HENT: NCAT, mucous membranes moist  Respiratory: Clear to auscultation bilaterally, respiratory effort normal   Cardiovascular: RRR, no murmurs, rubs, or gallops, palpable pedal pulses bilaterally  Gastrointestinal: Positive bowel sounds, soft, nontender, nondistended  Musculoskeletal: No bilateral ankle edema  Psychiatric: Appropriate affect, cooperative  Neurologic: Oriented to self, strength symmetric in all extremities, Cranial Nerves grossly intact to confrontation, speech clear  Skin: No rashes    Pertinent  and/or Most Recent Results       Results from last 7 days  Lab Units 10/13/18  0422 10/12/18  0941 10/12/18  0230   WBC 10*3/mm3  --  6.83 10.27   HEMOGLOBIN g/dL  --  12.6* 12.7*   HEMATOCRIT %  --  41.6 42.2   PLATELETS 10*3/mm3  --  114* 132*   SODIUM mmol/L 146 147* 145   POTASSIUM mmol/L 3.7 3.8 4.1   CHLORIDE mmol/L 107  108 107   CO2 mmol/L 36.0* 28.0 29.0   BUN mg/dL 18 17 17   CREATININE mg/dL 1.06 1.07 1.16   GLUCOSE mg/dL 88 119* 95   CALCIUM mg/dL 8.9 8.9 9.2       Results from last 7 days  Lab Units 10/12/18  0230   BILIRUBIN mg/dL 1.2   ALK PHOS U/L 100   ALT (SGPT) U/L 18   AST (SGOT) U/L 17       Results from last 7 days  Lab Units 10/13/18  0422 10/13/18  0421 10/12/18  0230   TSH mIU/mL 2.162  --   --    HEMOGLOBIN A1C % 5.70*  --   --    BNP pg/mL  --  1,209.0* 2,091.0*     Brief Urine Lab Results     None        Microbiology Results Abnormal     None        Imaging Results (all)     Procedure Component Value Units Date/Time    XR Chest 1 View [[id number]] Collected:  10/12/18 0209     Updated:  10/12/18 0308    Narrative:       EXAM:  XR Chest, 1 View    CLINICAL HISTORY:  78 years old, male; Pain; Chest pain; Left-sided chest pain; Additional info:   Chest pain/dyspnea    TECHNIQUE:  Frontal view of the chest.    COMPARISON:  No relevant prior studies available.    FINDINGS:  Lungs:  Left basilar opacity, likely atelectasis, although left lower   lobe/retrocardiac pneumonia possible.  Pleural space:  Small right pleural effusion with associated right basilar   atelectasis.  No pneumothorax.  Heart:  Cardiac silhouette is enlarged, compatible with cardiomegaly and/or   pericardial fluid.  Mediastinum:  Normal.  Bones/joints:  Changes of prior sternotomy.  Multilevel thoracic spine   degenerative changes.  Vasculature:  Atherosclerotic ossification of the thoracic aorta.      Impression:       1.  Small right pleural effusion with associated right basilar atelectasis.    2.  Left basilar opacity, likely atelectasis, although left lower   lobe/retrocardiac pneumonia possible.  Recommend followup.    3.  Incidental/non-acute findings are described above.    THIS DOCUMENT HAS BEEN ELECTRONICALLY SIGNED BY ALEXSANDER MORENO MD        Results for orders placed during the hospital encounter of 10/12/18   Adult Transthoracic Echo  Complete W/ Cont if Necessary Per Protocol    Narrative · Estimated EF = 25-30%. Global hypokinesis  · Mild mitral valve regurgitation is present  · Mild tricuspid valve regurgitation is present.  · Calculated right ventricular systolic pressure from tricuspid   regurgitation is 37 mmHg.  · Mildly reduced right ventricular systolic function noted.        Discharge Details        Discharge Medications      New Medications      Instructions Start Date   acetaminophen 325 MG tablet  Commonly known as:  TYLENOL   650 mg, Oral, Every 4 Hours PRN      aspirin 81 MG EC tablet   81 mg, Oral, Daily      carvedilol 6.25 MG tablet  Commonly known as:  COREG   6.25 mg, Oral, Every 12 Hours Scheduled      miconazole 2 % cream  Commonly known as:  MICOTIN   Apply to bilateral groin twice daily      sacubitril-valsartan 24-26 MG tablet  Commonly known as:  ENTRESTO   1 tablet, Oral, Every 12 Hours Scheduled         Changes to Medications      Instructions Start Date   furosemide 40 MG tablet  Commonly known as:  LASIX  What changed:  · medication strength  · how much to take   40 mg, Oral, Daily         Continue These Medications      Instructions Start Date   atorvastatin 40 MG tablet  Commonly known as:  LIPITOR   40 mg, Oral, Daily      citalopram 20 MG tablet  Commonly known as:  CeleXA   20 mg, Oral, Daily      DOCUSATE SODIUM PO   200 mg, Oral, Daily      fludrocortisone 0.1 MG tablet   0.1 mg, Oral, Daily      pantoprazole 40 MG EC tablet  Commonly known as:  PROTONIX   40 mg, Oral, Daily      Propylene Glycol 0.6 % solution   0.6 %, Ophthalmic, 3 Times Daily           Discharge Disposition:  Home or Self Care    Discharge Diet:  Diet Instructions     Diet: Regular, Cardiac; Thin       Discharge Diet:   Regular  Cardiac       Fluid Consistency:  Thin        Discharge Activity:   Activity Instructions     Activity as Tolerated           Code Status/Level of Support:  Code Status and Medical Interventions:   Ordered at:  10/12/18 0536     Level Of Support Discussed With:    Patient     Code Status:    CPR     Medical Interventions (Level of Support Prior to Arrest):    Full     No future appointments.    Time Spent on Discharge:  40 minutes    Electronically signed by Monie Lopez PA-C, 10/16/18, 12:21 PM.        Electronically signed by Monie Lopez PA-C at 10/16/2018 12:23 PM

## 2018-10-16 NOTE — DISCHARGE PLACEMENT REQUEST
"Trena Diggs  (78 y.o. Male)     To Garcia Shepherd    From Conchita Arciniega, [phone]        Date of Birth Social Security Number Address Home Phone MRN    1940  [facility] [address]  [facility] [phone] [id number]    [facility] Mar[facility] Status          None        Admission Date Admission Type Admitting Provider Attending Provider Department, Room/Bed    10/12/18 Emergency Santa Perez MD Hall, Holly, MD [facility] 5G, S562/1    Discharge Date Discharge Disposition Discharge Destination         Home or Self Care              Attending Provider:  Santa Perez MD    Allergies:  Eggs Or Egg-derived Products    Isolation:  None   Infection:  None   Code Status:  CPR    Ht:  177.8 cm (70\")   Wt:  83.5 kg (184 lb)    Admission Cmt:  None   Principal Problem:  Acute combined systolic and diastolic congestive heart failure (CMS/HCC) [I50.41] More...                 Active Insurance as of 10/12/2018     Primary Coverage     Payor Plan Insurance Group Employer/Plan Group    [id number]     Payor Plan Address Payor Plan Phone Number Effective From Effective To    [address]  1998     [facility] [id number]       Subscriber Name Subscriber Birth Date Member ID       TRENA DIGGS 1940 [id number]                 Emergency Contacts      (Rel.) Home Phone Work Phone Mobile Phone    Antwon Diggs (Son) -- -- [phone]               History & Physical      Day, Yumiko DE SOUZA MD at 10/12/2018  4:41 AM              [facility] Medicine Services  HISTORY AND PHYSICAL    Patient Name: Trena Diggs  : 1940  MRN: [id number]  Primary Care Physician: Provider, No Known  Date of admission: 10/12/2018      Subjective   Subjective     Chief Complaint:      HPI:  Trena Diggs is a 78 y.o. male w/ hx of CHF, COPD on 2 Liters O2 qhs prn presents w/ c/o increasing shortness of breath and inc o2 requirement w/ chest discomfort w/o " radiation.  No n/v/f/c.  Does have cough.  Feels like this may be his copd.  Has received iv lasix in er and feels that this has helped as he has had uop x 2.  Notes pnd/orthopnea and leg swelling as well.      Review of Systems    Otherwise 10-system ROS reviewed and is negative except as mentioned in the HPI.    Personal History     Past Medical History:   Diagnosis Date   • Anemia    • Atherosclerosis    • CHF (congestive heart failure) (CMS/HCC)    • Constipation    • Depression    • Dermatitis    • GERD (gastroesophageal reflux disease)    • Hyperlipidemia    • Myocardial infarction (CMS/HCC)    • Orthostatic hypotension    • Parkinson's disease (CMS/HCC)    • TIA (transient ischemic attack)    • Tinea corporis        Past Surgical History:   Procedure Laterality Date   • CATARACT EXTRACTION     • CORONARY ARTERY BYPASS GRAFT         Family History: family history is not on file. not able to provide    Social History:  reports that he has quit smoking. He does not have any smokeless tobacco history on file. He reports that he does not drink alcohol or use drugs.  Social History     Social History Narrative   • No narrative on file       Medications:  Available home medication information reviewed     No Known Allergies    Objective   Objective     Vital Signs:   Temp:  [98.6 °F (37 °C)] 98.6 °F (37 °C)  Heart Rate:  [94-99] 97  Resp:  [18-24] 18  BP: (153)/(110-120) 153/110        Physical Exam    gen; alert, oriented to person, mild resp distress  Heent; perrla, eomi, mmm  Cv; rr w/ tachycardia  L; faint crackles bll, no wheeze/poor inspir effort, mild distress  Abd; soft, +bs, ntnd  Ext; 3+ pitting edema, no cc  Skin; cdi, warm  Neuro; grossly intact  Psych; mood and affect appropriate    Results Reviewed:  I have personally reviewed current lab, radiology, and data and agree.      Results from last 7 days  Lab Units 10/12/18  0230   WBC 10*3/mm3 10.27   HEMOGLOBIN g/dL 12.7*   HEMATOCRIT % 42.2   PLATELETS  10*3/mm3 132*       Results from last 7 days  Lab Units 10/12/18  0230   SODIUM mmol/L 145   POTASSIUM mmol/L 4.1   CHLORIDE mmol/L 107   CO2 mmol/L 29.0   BUN mg/dL 17   CREATININE mg/dL 1.16   GLUCOSE mg/dL 95   CALCIUM mg/dL 9.2   ALT (SGPT) U/L 18   AST (SGOT) U/L 17     Estimated Creatinine Clearance: 61.6 mL/min (by C-G formula based on SCr of 1.16 mg/dL).  Brief Urine Lab Results     None        BNP   Date Value Ref Range Status   10/12/2018 2,091.0 (H) 0.0 - 100.0 pg/mL Final     Comment:     Results may be falsely decreased if patient taking Biotin.     Imaging Results (last 24 hours)     Procedure Component Value Units Date/Time    XR Chest 1 View [[id number]] Collected:  10/12/18 0209     Updated:  10/12/18 0308    Narrative:       EXAM:  XR Chest, 1 View    CLINICAL HISTORY:  78 years old, male; Pain; Chest pain; Left-sided chest pain; Additional info:   Chest pain/dyspnea    TECHNIQUE:  Frontal view of the chest.    COMPARISON:  No relevant prior studies available.    FINDINGS:  Lungs:  Left basilar opacity, likely atelectasis, although left lower   lobe/retrocardiac pneumonia possible.  Pleural space:  Small right pleural effusion with associated right basilar   atelectasis.  No pneumothorax.  Heart:  Cardiac silhouette is enlarged, compatible with cardiomegaly and/or   pericardial fluid.  Mediastinum:  Normal.  Bones/joints:  Changes of prior sternotomy.  Multilevel thoracic spine   degenerative changes.  Vasculature:  Atherosclerotic ossification of the thoracic aorta.      Impression:         1.  Small right pleural effusion with associated right basilar atelectasis.    2.  Left basilar opacity, likely atelectasis, although left lower   lobe/retrocardiac pneumonia possible.  Recommend followup.    3.  Incidental/non-acute findings are described above.    THIS DOCUMENT HAS BEEN ELECTRONICALLY SIGNED BY ALEXSANDER MORENO MD             Assessment/Plan   Assessment / Plan     Active Hospital Problems     Diagnosis   • Dyspnea   • Acute CHF (CMS/[facility])   • COPD exacerbation (CMS/[facility])   • Anemia   • Thrombocytopenia (CMS/[facility])         Assessment & Plan:  79 y/o male w/ reported hx of copd, chf though details unknown here w/   1. prob acute systolic chf exacerbation;  Cont diuresis w/ 40 lasix iv bid for now; obtain echo and consult heart failure navigator.  Troponin negative.  2. ??copd exacerbation; think sx primarily due to above.  Will hold on further steroid use.  Nebs prn.  3. Anemia, mild; hx of; stable   4. Thrombocytopenia; monitor, mild.       DVT prophylaxis:heparin (monitor platelets)    CODE STATUS:  Full (unable to ask pt)  There are no questions and answers to display.       Admission Status:  I believe this patient meets  OBSERVATION status, however if further evaluation or treatment plans warrant, status may change.  Based upon current information, I predict patient's care encounter to be less than or equal to 2 midnights.      Electronically signed by Yumiko Walker MD, 10/12/18, 4:41 AM.          Electronically signed by Yumiko Walker MD at 10/12/2018  5:43 AM             ICU Vital Signs     Row Name 10/16/18 1016 10/16/18 0816 10/16/18 0600 10/16/18 0500 10/16/18 0400       Height and Weight    Weight  --  --  -- 83.5 kg (184 lb)  --    Weight Method  --  --  -- Bed scale  --       Vitals    Pulse  -- 93  --  --  --    BP  -- 140/94  --  --  --       Oxygen Therapy    Device (Oxygen Therapy) nasal cannula nasal cannula nasal cannula  -- nasal cannula    Flow (L/min) 2 2 2  -- 2    Row Name 10/16/18 0200 10/16/18 0000 10/15/18 2200 10/15/18 2000 10/15/18 1906       Vitals    Temp  --  --  --  -- 98.1 °F (36.7 °C)    Temp src  --  --  --  -- Oral    Pulse  --  --  --  -- 74    Heart Rate Source  --  --  --  -- Monitor    Resp  --  --  --  -- 18    Resp Rate Source  --  --  --  -- Visual    BP  --  --  --  -- 128/87       Oxygen Therapy    Device (Oxygen Therapy) nasal cannula nasal cannula nasal cannula  nasal cannula  --    Flow (L/min) 2 2 2 2  --    Row Name 10/15/18 1820 10/15/18 1620 10/15/18 1410             Oxygen Therapy    Device (Oxygen Therapy) nasal cannula nasal cannula nasal cannula      Flow (L/min) 2 2 2          Hospital Medications (active)       Dose Frequency Start End    acetaminophen (TYLENOL) tablet 650 mg 650 mg Every 4 Hours PRN 10/12/2018     Sig - Route: Take 2 tablets by mouth Every 4 (Four) Hours As Needed for Mild Pain . - Oral    aspirin EC tablet 81 mg 81 mg Daily 10/13/2018     Sig - Route: Take 1 tablet by mouth Daily. - Oral    atorvastatin (LIPITOR) tablet 40 mg 40 mg Daily 10/12/2018     Sig - Route: Take 1 tablet by mouth Daily. - Oral    carvedilol (COREG) tablet 6.25 mg 6.25 mg Every 12 Hours Scheduled 10/14/2018     Sig - Route: Take 1 tablet by mouth Every 12 (Twelve) Hours. - Oral    citalopram (CeleXA) tablet 20 mg 20 mg Daily 10/12/2018     Sig - Route: Take 1 tablet by mouth Daily. - Oral    docusate sodium (COLACE) capsule 200 mg 200 mg Daily 10/12/2018     Sig - Route: Take 2 capsules by mouth Daily. - Oral    fludrocortisone tablet 0.1 mg 0.1 mg Daily 10/12/2018     Sig - Route: Take 1 tablet by mouth Daily. - Oral    furosemide (LASIX) tablet 40 mg 40 mg Daily 10/15/2018     Sig - Route: Take 1 tablet by mouth Daily. - Oral    heparin (porcine) 5000 UNIT/ML injection 5,000 Units 5,000 Units Every 12 Hours Scheduled 10/12/2018     Sig - Route: Inject 1 mL under the skin into the appropriate area as directed Every 12 (Twelve) Hours. - Subcutaneous    miconazole (MICOTIN) 2 % cream  Every 12 Hours Scheduled 10/13/2018     Sig - Route: Apply  topically to the appropriate area as directed Every 12 (Twelve) Hours. - Topical    Cosign for Ordering: Accepted by Santa Perez MD on 10/13/2018 11:31 AM    pantoprazole (PROTONIX) EC tablet 40 mg 40 mg Daily 10/12/2018     Sig - Route: Take 1 tablet by mouth Daily. - Oral    polyvinyl alcohol (LIQUIFILM) 1.4 % ophthalmic solution  "1 drop 1 drop Every 1 Hour PRN 10/12/2018     Sig - Route: Administer 1 drop to both eyes Every 1 (One) Hour As Needed for Dry Eyes. - Both Eyes    sacubitril-valsartan (ENTRESTO) 24-26 MG tablet 1 tablet 1 tablet Every 12 Hours Scheduled 10/14/2018     Sig - Route: Take 1 tablet by mouth Every 12 (Twelve) Hours. - Oral    sodium chloride 0.9 % flush 10 mL 10 mL As Needed 10/12/2018     Sig - Route: Infuse 10 mL into a venous catheter As Needed for Line Care. - Intravenous    Linked Group 1:  \"And\" Linked Group Details        sodium chloride 0.9 % flush 3 mL 3 mL Every 12 Hours Scheduled 10/12/2018     Sig - Route: Infuse 3 mL into a venous catheter Every 12 (Twelve) Hours. - Intravenous    sodium chloride 0.9 % flush 3-10 mL 3-10 mL As Needed 10/12/2018     Sig - Route: Infuse 3-10 mL into a venous catheter As Needed for Line Care. - Intravenous             Physical Therapy Notes (most recent note)      Zulma Ashley, PT at 10/15/2018  1:09 PM  Version 2 of 2         Acute Care - Physical Therapy Initial Evaluation  [facility]     Patient Name: Edwardo Diggs  : 1940  MRN: [id number]  Today's Date: 10/15/2018   Onset of Illness/Injury or Date of Surgery: 10/12/18  Date of Referral to PT: 10/13/18  Referring Physician: MARCOS Cartwright      Admit Date: 10/12/2018    Visit Dx:     ICD-10-CM ICD-9-CM   1. Respiratory distress R06.03 786.09   2. COPD with acute exacerbation (CMS/[facility]) J44.1 491.21   3. Acute on chronic systolic congestive heart failure (CMS/HCC) I50.23 428.23     428.0   4. Impaired functional mobility, balance, gait, and endurance Z74.09 V49.89     Patient Active Problem List   Diagnosis   • Dyspnea   • Acute combined systolic and diastolic congestive heart failure (CMS/HCC)   • COPD exacerbation (CMS/[facility])   • Anemia   • Thrombocytopenia (CMS/[facility])   • Respiratory distress   • Dyslipidemia   • CAD (coronary artery disease)   • History of cardioembolic cerebrovascular accident (CVA)   • Poor " "historian   • Chronic respiratory failure with hypoxia (CMS/HCC)   • Hx of CABG     Past Medical History:   Diagnosis Date   • Anemia    • Constipation    • Depression    • Dermatitis    • GERD (gastroesophageal reflux disease)    • Hyperlipidemia    • Orthostatic hypotension    • Parkinson's disease (CMS/HCC)    • Tinea corporis      Past Surgical History:   Procedure Laterality Date   • CATARACT EXTRACTION     • CORONARY ARTERY BYPASS GRAFT          PT ASSESSMENT (last 12 hours)      Physical Therapy Evaluation     Row Name 10/15/18 1413          PT Evaluation Time/Intention    Subjective Information complains of;dizziness  -LM     Document Type evaluation  -LM     Mode of Treatment individual therapy;physical therapy  -LM     Patient Effort adequate  -LM     Symptoms Noted During/After Treatment dizziness  -LM     Comment Upon standing at EOB, pt reports he became extremely dizzy and needed to lay back down.  When asked how long this has been going on, pt reports \"forever.\"  States this is why he falls at home.  RN notified.  -LM     Row Name 10/15/18 3034          General Information    Patient Profile Reviewed? yes  -LM     Onset of Illness/Injury or Date of Surgery 10/12/18  -LM     Referring Physician MARCOS Cartwright  -LM     Patient Observations cooperative;agree to therapy  -LM     Patient/Family Observations No family present.  -LM     General Observations of Patient Pt lying in bed.  Noted to have O2.  Pleasant and agreeable to PT eval.  -LM     Prior Level of Function independent:;all household mobility;gait;min assist:;ADL's   Initially states (I), later states son helps w/ some things  -LM     Equipment Currently Used at Home rollator;oxygen  -LM     Pertinent History of Current Functional Problem Complaints of SOA and cough.  Dx: CHF  -LM     Existing Precautions/Restrictions fall;oxygen therapy device and L/min  -LM     Risks Reviewed patient:;LOB;increased discomfort  -LM     Benefits Reviewed " patient:;improve function;increase independence;increase strength;increase balance  -LM     Barriers to Rehab none identified  -     Row Name 10/15/18 1055          Relationship/Environment    Lives With facility resident   Valdo Shepherd  -     Row Name 10/15/18 1055          Resource/Environmental Concerns    Current Living Arrangements extended care facility  -     Row Name 10/15/18 1055          Cognitive Assessment/Interventions    Additional Documentation Cognitive Assessment/Intervention (Group)  -     Row Name 10/15/18 1055          Cognitive Assessment/Intervention- PT/OT    Affect/Mental Status (Cognitive) confused   Intermittent  -LM     Orientation Status (Cognition) oriented to;person;place  -LM     Follows Commands (Cognition) follows one step commands;over 90% accuracy;verbal cues/prompting required  -     Cognitive Function (Cognitive) safety deficit  -LM     Safety Deficit (Cognitive) mild deficit;safety precautions awareness  -     Personal Safety Interventions fall prevention program maintained;gait belt;muscle strengthening facilitated;nonskid shoes/slippers when out of bed  -     Row Name 10/15/18 1055          Bed Mobility Assessment/Treatment    Bed Mobility Assessment/Treatment supine-sit  -LM     Supine-Sit [place] (Bed Mobility) minimum assist (75% patient effort)  -LM     Assistive Device (Bed Mobility) bed rails;head of bed elevated  -     Row Name 10/15/18 1055          Transfer Assessment/Treatment    Transfer Assessment/Treatment sit-stand transfer;stand-sit transfer  -LM     Sit-Stand [place] (Transfers) minimum assist (75% patient effort)  -LM     Stand-Sit [place] (Transfers) contact guard  -     Row Name 10/15/18 1055          General ROM    GENERAL ROM COMMENTS BLE AROM WFL  -     Row Name 10/15/18 1055          MMT (Manual Muscle Testing)    General MMT Comments BLEs grossly 4/5 throughout  -     Row Name 10/15/18 1055          Sensory  Assessment/Intervention    Sensory General Assessment no sensation deficits identified  -LM     Row Name 10/15/18 1055          Pain Assessment    Additional Documentation Pain Scale: Numbers Pre/Post-Treatment (Group)  -LM     Row Name 10/15/18 1055          Pain Scale: Numbers Pre/Post-Treatment    Pain Scale: Numbers, Pretreatment 0/10 - no pain  -LM     Pain Scale: Numbers, Post-Treatment 0/10 - no pain  -LM     Row Name             Wound 10/12/18 0236 Left elbow skin tear    Wound - Properties Group Date first assessed: 10/12/18  -HS Time first assessed: 0236  -HS Present On Admission : yes  -HS Side: Left  -HS Location: elbow  -HS Type: skin tear  -HS    Row Name             Wound 10/12/18 0630 Bilateral coccyx pressure injury    Wound - Properties Group Date first assessed: 10/12/18  -BC Time first assessed: 0630  -BC Side: Bilateral  -BC Location: coccyx  -BC Type: pressure injury  -BC Stage, Pressure Injury: Stage 2  -BC    Row Name             Wound 10/12/18 0630 Bilateral groin MASD (moisture associated skin damage)    Wound - Properties Group Date first assessed: 10/12/18  -BC Time first assessed: 0630  -BC Side: Bilateral  -BC Location: groin  -BC Type: MASD (moisture associated skin damage)  -BC    Row Name 10/15/18 1055          Plan of Care Review    Plan of Care Reviewed With patient  -     Row Name 10/15/18 1055          Physical Therapy Clinical Impression    Date of Referral to PT 10/13/18  -     PT Diagnosis (PT Clinical Impression) Impaired functional mobility, balance, gait, and endurance  -     Criteria for Skilled Interventions Met (PT Clinical Impression) yes;treatment indicated  -LM     Rehab Potential (PT Clinical Summary) good, to achieve stated therapy goals  -     Care Plan Review (PT) evaluation/treatment results reviewed;care plan/treatment goals reviewed;risks/benefits reviewed;current/potential barriers reviewed;patient/other agree to care plan  -     Row Name 10/15/18  1055          Vital Signs    Pre Systolic BP Rehab 119  -LM     Pre Treatment Diastolic BP 72  -LM     Pretreatment Heart Rate (beats/min) 84  -LM     Pre Patient Position Supine  -LM     Intra Patient Position Standing  -LM     Post Patient Position Supine  -LM     Row Name 10/15/18 1059          Physical Therapy Goals    Bed Mobility Goal Selection (PT) bed mobility, PT goal 1  -LM     Transfer Goal Selection (PT) transfer, PT goal 1  -LM     Gait Training Goal Selection (PT) gait training, PT goal 1  -LM     Row Name 10/15/18 1058          Bed Mobility Goal 1 (PT)    Activity/Assistive Device (Bed Mobility Goal 1, PT) sit to supine/supine to sit  -LM     [place] Level/Cues Needed (Bed Mobility Goal 1, PT) supervision required  -LM     Time Frame (Bed Mobility Goal 1, PT) long term goal (LTG);2 weeks  -LM     Row Name 10/15/18 1058          Transfer Goal 1 (PT)    Activity/Assistive Device (Transfer Goal 1, PT) bed-to-chair/chair-to-bed  -LM     [place] Level/Cues Needed (Transfer Goal 1, PT) supervision required  -LM     Time Frame (Transfer Goal 1, PT) long term goal (LTG);2 weeks  -LM     Row Name 10/15/18 1050          Gait Training Goal 1 (PT)    Activity/Assistive Device (Gait Training Goal 1, PT) gait (walking locomotion);assistive device use  -LM     [place] Level (Gait Training Goal 1, PT) supervision required  -LM     Distance (Gait Goal 1, PT) 150 feet  -LM     Time Frame (Gait Training Goal 1, PT) long term goal (LTG);2 weeks  -LM     Row Name 10/15/18 1059          Positioning and Restraints    Pre-Treatment Position in bed  -LM     Post Treatment Position bed  -LM     In Bed supine;call light within reach;encouraged to call for assist;exit alarm on;notified nsg  -LM     Row Name 10/15/18 1054          Living Environment    Home Accessibility --   No concerns  -LM       User Key  (r) = Recorded By, (t) = Taken By, (c) = Cosigned By    Initials Name Provider Type    Zulma Howard, PT  Physical Therapist    Alida Rodríguez, RN Registered Nurse     Santa Haile RN Registered Nurse          Physical Therapy Education     Title: PT OT SLP Therapies (Active)     Topic: Physical Therapy (Active)     Point: Mobility training (Active)    Learning Progress Summary     Learner Status Readiness Method Response Comment Documented by    Patient Active Acceptance E NR  LM 10/15/18 1307          Point: Precautions (Active)    Learning Progress Summary     Learner Status Readiness Method Response Comment Documented by    Patient Active Acceptance E NR  LM 10/15/18 1307                      User Key     Initials Effective Dates Name Provider Type Discipline     06/15/16 -  Zulma Ashley, PT Physical Therapist PT                PT Recommendation and Plan  Anticipated Discharge Disposition (PT): skilled nursing facility  Planned Therapy Interventions (PT Eval): bed mobility training, balance training, gait training, home exercise program, neuromuscular re-education, patient/family education, postural re-education, ROM (range of motion), strengthening, stretching, transfer training  Therapy Frequency (PT Clinical Impression): daily  Outcome Summary/Treatment Plan (PT)  Anticipated Discharge Disposition (PT): skilled nursing facility  Plan of Care Reviewed With: patient  Outcome Summary: PT evaluation completed on this date.  Pt transferred supine-->sit Pavel, stood with Pavel, and returned to supine with SBA.  Eval limited 2* to when pt stood, he reported dizziness.  Skilled PT needed to improve mobility and safety prior to d/c.  Recommend d/c back to SNF.          Outcome Measures     Row Name 10/15/18 2604             How much help from another person do you currently need...    Turning from your back to your side while in flat bed without using bedrails? 3  -LM      Moving from lying on back to sitting on the side of a flat bed without bedrails? 3  -LM      Moving to and from a bed to a chair (including  a wheelchair)? 3  -LM      Standing up from a chair using your arms (e.g., wheelchair, bedside chair)? 3  -LM      Climbing 3-5 steps with a railing? 2  -LM      To walk in hospital room? 3  -LM      AM-PAC 6 Clicks Score 17  -LM         Functional Assessment    Outcome Measure Options AM-PAC 6 Clicks Basic Mobility (PT)  -LM        User Key  (r) = Recorded By, (t) = Taken By, (c) = Cosigned By    Initials Name Provider Type    LM Zulma Ashley, PT Physical Therapist           Time Calculation:         PT Charges     Row Name 10/15/18 1055             Time Calculation    Start Time 1055  -LM      PT Received On 10/15/18  -LM      PT Goal Re-Cert Due Date 10/25/18  -LM        User Key  (r) = Recorded By, (t) = Taken By, (c) = Cosigned By    Initials Name Provider Type    LM Zulma Ashley, PT Physical Therapist        Therapy Suggested Charges     Code   Minutes Charges    None           Therapy Charges for Today     Code Description Service Date Service Provider Modifiers Qty    96748713302 HC PT EVAL MOD COMPLEXITY 4 10/15/2018 Zulma Ashley, PT GP 1    79369595600 HC PT MOBILITY CURRENT 10/15/2018 Zulma Ashley, PT GP, CK 1    30738307242 HC PT MOBILITY PROJECTED 10/15/2018 Zulma Ashley, PT GP, CJ 1          PT G-Codes  PT Professional Judgement Used?: Yes  Outcome Measure Options: AM-PAC 6 Clicks Basic Mobility (PT)  AM-PAC 6 Clicks Score: 17  Functional Limitation: Mobility: Walking and moving around  Mobility: Walking and Moving Around Current Status (): At least 40 percent but less than 60 percent impaired, limited or restricted  Mobility: Walking and Moving Around Goal Status (): At least 20 percent but less than 40 percent impaired, limited or restricted      Zulma Ashley PT  10/15/2018         Electronically signed by Zulma Ashley, PT at 10/15/2018  2:15 PM     Zulma Ashley PT at 10/15/2018  1:09 PM  Version 1 of 2         Acute Care - Physical Therapy Initial Evaluation  [facility]    "  Patient Name: Edwardo Diggs  : 1940  MRN: [id number]  Today's Date: 10/15/2018   Onset of Illness/Injury or Date of Surgery: 10/12/18  Date of Referral to PT: 10/13/18  Referring Physician: MARCOS Cartwright      Admit Date: 10/12/2018    Visit Dx:     ICD-10-CM ICD-9-CM   1. Respiratory distress R06.03 786.09   2. COPD with acute exacerbation (CMS/[facility]) J44.1 491.21   3. Acute on chronic systolic congestive heart failure (CMS/HCC) I50.23 428.23     428.0   4. Impaired functional mobility, balance, gait, and endurance Z74.09 V49.89     Patient Active Problem List   Diagnosis   • Dyspnea   • Acute combined systolic and diastolic congestive heart failure (CMS/HCC)   • COPD exacerbation (CMS/[facility])   • Anemia   • Thrombocytopenia (CMS/[facility])   • Respiratory distress   • Dyslipidemia   • CAD (coronary artery disease)   • History of cardioembolic cerebrovascular accident (CVA)   • Poor historian   • Chronic respiratory failure with hypoxia (CMS/[facility])   • Hx of CABG     Past Medical History:   Diagnosis Date   • Anemia    • Constipation    • Depression    • Dermatitis    • GERD (gastroesophageal reflux disease)    • Hyperlipidemia    • Orthostatic hypotension    • Parkinson's disease (CMS/[facility])    • Tinea corporis      Past Surgical History:   Procedure Laterality Date   • CATARACT EXTRACTION     • CORONARY ARTERY BYPASS GRAFT          PT ASSESSMENT (last 12 hours)      Physical Therapy Evaluation     Row Name 10/15/18 1055          PT Evaluation Time/Intention    Subjective Information complains of;dizziness  -LM     Document Type evaluation  -LM     Mode of Treatment individual therapy;physical therapy  -LM     Patient Effort adequate  -LM     Symptoms Noted During/After Treatment dizziness  -LM     Comment Upon standing at EOB, pt reports he became extremely dizzy and needed to lay back down.  When asked how long this has been going on, pt reports \"forever.\"  States this is why he falls at home.  RN notified.  -LM  "    Row Name 10/15/18 1055          General Information    Patient Profile Reviewed? yes  -LM     Onset of Illness/Injury or Date of Surgery 10/12/18  -LM     Referring Physician MARCOS Cartwright  -LM     Patient Observations cooperative;agree to therapy  -LM     Patient/Family Observations No family present.  -LM     General Observations of Patient Pt lying in bed.  Noted to have O2.  Pleasant and agreeable to PT eval.  -LM     Prior Level of Function independent:;all household mobility;gait;min assist:;ADL's   Initially states (I), later states son helps w/ some things  -LM     Equipment Currently Used at Home rollator;oxygen  -LM     Pertinent History of Current Functional Problem Complaints of SOA and cough.  Dx: CHF  -LM     Existing Precautions/Restrictions fall;oxygen therapy device and L/min  -LM     Risks Reviewed patient:;LOB;increased discomfort  -LM     Benefits Reviewed patient:;improve function;increase independence;increase strength;increase balance  -LM     Barriers to Rehab none identified  -LM     Row Name 10/15/18 1055          Relationship/Environment    Lives With facility resident   Valdo Shepherd  -     Row Name 10/15/18 1055          Resource/Environmental Concerns    Current Living Arrangements extended care facility  -     Row Name 10/15/18 1055          Cognitive Assessment/Interventions    Additional Documentation Cognitive Assessment/Intervention (Group)  -LM     Row Name 10/15/18 1055          Cognitive Assessment/Intervention- PT/OT    Affect/Mental Status (Cognitive) confused   Intermittent  -LM     Orientation Status (Cognition) oriented to;person;place  -LM     Follows Commands (Cognition) follows one step commands;over 90% accuracy;verbal cues/prompting required  -LM     Cognitive Function (Cognitive) safety deficit  -LM     Safety Deficit (Cognitive) mild deficit;safety precautions awareness  -LM     Personal Safety Interventions fall prevention program maintained;gait  belt;muscle strengthening facilitated;nonskid shoes/slippers when out of bed  -LM     Row Name 10/15/18 1055          Bed Mobility Assessment/Treatment    Bed Mobility Assessment/Treatment supine-sit  -LM     Supine-Sit [place] (Bed Mobility) minimum assist (75% patient effort)  -LM     Assistive Device (Bed Mobility) bed rails;head of bed elevated  -LM     Row Name 10/15/18 1055          Transfer Assessment/Treatment    Transfer Assessment/Treatment sit-stand transfer;stand-sit transfer  -LM     Sit-Stand [place] (Transfers) minimum assist (75% patient effort)  -LM     Stand-Sit [place] (Transfers) contact guard  -LM     Row Name 10/15/18 1055          General ROM    GENERAL ROM COMMENTS BLE AROM WFL  -LM     Row Name 10/15/18 1055          MMT (Manual Muscle Testing)    General MMT Comments BLEs grossly 4/5 throughout  -[facility] Name 10/15/18 1055          Sensory Assessment/Intervention    Sensory General Assessment no sensation deficits identified  -     Row Name 10/15/18 1055          Pain Assessment    Additional Documentation Pain Scale: Numbers Pre/Post-Treatment (Group)  -LM     Row Name 10/15/18 1055          Pain Scale: Numbers Pre/Post-Treatment    Pain Scale: Numbers, Pretreatment 0/10 - no pain  -LM     Pain Scale: Numbers, Post-Treatment 0/10 - no pain  -LM     Row Name             Wound 10/12/18 0236 Left elbow skin tear    Wound - Properties Group Date first assessed: 10/12/18  -HS Time first assessed: 0236  -HS Present On Admission : yes  -HS Side: Left  -HS Location: elbow  -HS Type: skin tear  -HS    Row Name             Wound 10/12/18 0630 Bilateral coccyx pressure injury    Wound - Properties Group Date first assessed: 10/12/18  -BC Time first assessed: 0630  -BC Side: Bilateral  -BC Location: coccyx  -BC Type: pressure injury  -BC Stage, Pressure Injury: Stage 2  -BC    Row Name             Wound 10/12/18 0630 Bilateral groin MASD (moisture associated skin damage)    Wound -  Properties Group Date first assessed: 10/12/18  -BC Time first assessed: 0630  -BC Side: Bilateral  -BC Location: groin  -BC Type: MASD (moisture associated skin damage)  -BC    Row Name 10/15/18 1055          Plan of Care Review    Plan of Care Reviewed With patient  -LM     Row Name 10/15/18 1055          Physical Therapy Clinical Impression    Date of Referral to PT 10/13/18  -LM     PT Diagnosis (PT Clinical Impression) Impaired functional mobility, balance, gait, and endurance  -LM     Criteria for Skilled Interventions Met (PT Clinical Impression) yes;treatment indicated  -LM     Rehab Potential (PT Clinical Summary) good, to achieve stated therapy goals  -LM     Care Plan Review (PT) evaluation/treatment results reviewed;care plan/treatment goals reviewed;risks/benefits reviewed;current/potential barriers reviewed;patient/other agree to care plan  -     Row Name 10/15/18 1055          Vital Signs    Pre Systolic BP Rehab 119  -LM     Pre Treatment Diastolic BP 72  -LM     Pretreatment Heart Rate (beats/min) 84  -LM     Pre Patient Position Supine  -LM     Intra Patient Position Standing  -LM     Post Patient Position Supine  -LM     Row Name 10/15/18 1055          Physical Therapy Goals    Bed Mobility Goal Selection (PT) bed mobility, PT goal 1  -LM     Transfer Goal Selection (PT) transfer, PT goal 1  -LM     Gait Training Goal Selection (PT) gait training, PT goal 1  -LM     Row Name 10/15/18 1055          Bed Mobility Goal 1 (PT)    Activity/Assistive Device (Bed Mobility Goal 1, PT) sit to supine/supine to sit  -LM     [place] Level/Cues Needed (Bed Mobility Goal 1, PT) supervision required  -LM     Time Frame (Bed Mobility Goal 1, PT) long term goal (LTG);2 weeks  -LM     Row Name 10/15/18 1052          Transfer Goal 1 (PT)    Activity/Assistive Device (Transfer Goal 1, PT) bed-to-chair/chair-to-bed  -LM     [place] Level/Cues Needed (Transfer Goal 1, PT) supervision required  -LM     Time  Frame (Transfer Goal 1, PT) long term goal (LTG);2 weeks  -LM     Row Name 10/15/18 1057          Gait Training Goal 1 (PT)    Activity/Assistive Device (Gait Training Goal 1, PT) gait (walking locomotion);assistive device use  -LM     [place] Level (Gait Training Goal 1, PT) supervision required  -LM     Distance (Gait Goal 1, PT) 150 feet  -LM     Time Frame (Gait Training Goal 1, PT) long term goal (LTG);2 weeks  -LM     Row Name 10/15/18 1056          Positioning and Restraints    Pre-Treatment Position in bed  -LM     Post Treatment Position bed  -LM     In Bed supine;call light within reach;encouraged to call for assist;exit alarm on;notified nsg  -LM     Row Name 10/15/18 1056          Living Environment    Home Accessibility --   No concerns  -LM       User Key  (r) = Recorded By, (t) = Taken By, (c) = Cosigned By    Initials Name Provider Type    LM Zulma Ashley, PT Physical Therapist    Alida Rodríguez, RN Registered Nurse     Santa Haile RN Registered Nurse          Physical Therapy Education     Title: PT OT SLP Therapies (Active)     Topic: Physical Therapy (Active)     Point: Mobility training (Active)    Learning Progress Summary     Learner Status Readiness Method Response Comment Documented by    Patient Active Acceptance E NR  LM 10/15/18 1307          Point: Precautions (Active)    Learning Progress Summary     Learner Status Readiness Method Response Comment Documented by    Patient Active Acceptance E NR  LM 10/15/18 1307                      User Key     Initials Effective Dates Name Provider Type Discipline    LM 06/15/16 -  Zulma Ashley, PT Physical Therapist PT                PT Recommendation and Plan  Anticipated Discharge Disposition (PT): skilled nursing facility  Planned Therapy Interventions (PT Eval): bed mobility training, balance training, gait training, home exercise program, neuromuscular re-education, patient/family education, postural re-education, ROM (range  of motion), strengthening, stretching, transfer training  Therapy Frequency (PT Clinical Impression): daily  Outcome Summary/Treatment Plan (PT)  Anticipated Discharge Disposition (PT): skilled nursing facility  Plan of Care Reviewed With: patient  Outcome Summary: PT evaluation completed on this date.  Pt transferred supine-->sit Pavel, stood with Pavel, and returned to supine with SBA.  Eval limited 2* to when pt stood, he reported dizziness.  Skilled PT needed to improve mobility and safety prior to d/c.  Recommend d/c back to SNF.          Outcome Measures     Row Name 10/15/18 1055             How much help from another person do you currently need...    Turning from your back to your side while in flat bed without using bedrails? 3  -LM      Moving from lying on back to sitting on the side of a flat bed without bedrails? 3  -LM      Moving to and from a bed to a chair (including a wheelchair)? 3  -LM      Standing up from a chair using your arms (e.g., wheelchair, bedside chair)? 3  -LM      Climbing 3-5 steps with a railing? 2  -LM      To walk in hospital room? 3  -LM      AM-PAC 6 Clicks Score 17  -LM         Functional Assessment    Outcome Measure Options AM-PAC 6 Clicks Basic Mobility (PT)  -LM        User Key  (r) = Recorded By, (t) = Taken By, (c) = Cosigned By    Initials Name Provider Type    Zulma Howard PT Physical Therapist           Time Calculation:         PT Charges     Row Name 10/15/18 1055             Time Calculation    Start Time 1055  -LM      PT Received On 10/15/18  -LM      PT Goal Re-Cert Due Date 10/25/18  -LM        User Key  (r) = Recorded By, (t) = Taken By, (c) = Cosigned By    Initials Name Provider Type    Zulma Howard PT Physical Therapist        Therapy Suggested Charges     Code   Minutes Charges    None           Therapy Charges for Today     Code Description Service Date Service Provider Modifiers Qty    [id number]  PT EVAL MOD COMPLEXITY 4 10/15/2018 Dion  Zulma PT GP 1          PT G-Codes  Outcome Measure Options: AM-PAC 6 Clicks Basic Mobility (PT)  AM-PAC 6 Clicks Score: 17      Zulma Ashley PT  10/15/2018         Electronically signed by Zulma Ashley PT at 10/15/2018  1:10 PM       Occupational Therapy Notes (most recent note)     No notes of this type exist for this encounter.           Discharge Summary      Monie Lopez PA-C at 10/16/2018 12:08 PM              [facility] Medicine Services  DISCHARGE SUMMARY    Patient Name: Edwardo Diggs  : 1940  MRN: [id number]    Date of Admission: 10/12/2018  Date of Discharge:  10/16/2018  Primary Care Physician: Provider, No Known    Consults     Date and Time Order Name Status Description    10/12/2018 1705 Inpatient Cardiology Consult Completed         Hospital Course     Presenting Problem:   Respiratory distress [R06.03]    Active Hospital Problems    Diagnosis Date Noted   • Chronic systolic CHF (congestive heart failure) (CMS/HCC) [I50.22] 10/16/2018   • Dyslipidemia [E78.5] 10/13/2018   • CAD (coronary artery disease) [I25.10] 10/13/2018   • History of cardioembolic cerebrovascular accident (CVA) [Z86.73] 10/13/2018   • Poor historian [Z78.9] 10/13/2018   • Chronic respiratory failure with hypoxia (CMS/HCC) [J96.11] 10/13/2018   • Hx of CABG [Z95.1] 10/13/2018   • Anemia [D64.9] 10/12/2018      Resolved Hospital Problems    Diagnosis Date Noted Date Resolved   • **Acute combined systolic and diastolic congestive heart failure (CMS/HCC) [I50.41] 10/12/2018 10/16/2018   • Dyspnea [R06.00] 10/12/2018 10/16/2018   • COPD exacerbation (CMS/HCC) [J44.1] 10/12/2018 10/16/2018   • Thrombocytopenia (CMS/HCC) [D69.6] 10/12/2018 10/16/2018   • Respiratory distress [R06.03] 10/12/2018 10/16/2018      Hospital Course:  Mr. Edwardo Diggs is an 79yo male with PMH significant for dementia, HTN, hyperlipidemia, CAD (s/p CABG x 5), chronic systolic heart failure and COPD on 2L NC at all times.  He is a resident of [facility]. He was brought to [facility] ED on 10/12/18 for evaluation of dyspnea with increasing O2 requirements and chest discomfort without radiation. He noted orthopnea, paroxsymal nocturnal dyspnea and leg swelling as well. BNP in the ED 1200. Labs otherwise unremarkable.     He was given IV lasix and Solumedrol in the ED and admitted to the hospital medicine service. IV diuresis was continued. Cardiology was consulted and Dr. Shirley saw the patient. He was started on Coreg and Entresto. ECHO revealed EF 25-30% with RVSP 37 mmHg and mild valvular heart disease (mitral and tricuspid). Cardiac PET scan was initially recommended but ultimately, deferred because results would not change overall course of care given the patient's age and his dementia. Mr. Diggs is improved and he will return to [facility] on 10/16/18.     Day of Discharge     HPI:   Laying in bed, wants to be repositioned. No chest pain, dyspnea, nausea/vomiting or diarrhea. No new issues per nursing. He is stable on baseline 2L NC.     Review of Systems  Gen- No fevers, chills  CV- No chest pain, palpitations  Resp- No cough, dyspnea  GI- No N/V/D, abd pain    Otherwise ROS is negative except as mentioned in the HPI.    Vital Signs:   Temp:  [98.1 °F (36.7 °C)] 98.1 °F (36.7 °C)  Heart Rate:  [74-93] 93  Resp:  [18] 18  BP: (128-140)/(87-94) 140/94     Physical Exam:  Constitutional: No acute distress, awake, alert  HENT: NCAT, mucous membranes moist  Respiratory: Clear to auscultation bilaterally, respiratory effort normal   Cardiovascular: RRR, no murmurs, rubs, or gallops, palpable pedal pulses bilaterally  Gastrointestinal: Positive bowel sounds, soft, nontender, nondistended  Musculoskeletal: No bilateral ankle edema  Psychiatric: Appropriate affect, cooperative  Neurologic: Oriented to self, strength symmetric in all extremities, Cranial Nerves grossly intact to confrontation, speech clear  Skin: No  rashes    Pertinent  and/or Most Recent Results       Results from last 7 days  Lab Units 10/13/18  0422 10/12/18  0941 10/12/18  0230   WBC 10*3/mm3  --  6.83 10.27   HEMOGLOBIN g/dL  --  12.6* 12.7*   HEMATOCRIT %  --  41.6 42.2   PLATELETS 10*3/mm3  --  114* 132*   SODIUM mmol/L 146 147* 145   POTASSIUM mmol/L 3.7 3.8 4.1   CHLORIDE mmol/L 107 108 107   CO2 mmol/L 36.0* 28.0 29.0   BUN mg/dL 18 17 17   CREATININE mg/dL 1.06 1.07 1.16   GLUCOSE mg/dL 88 119* 95   CALCIUM mg/dL 8.9 8.9 9.2       Results from last 7 days  Lab Units 10/12/18  0230   BILIRUBIN mg/dL 1.2   ALK PHOS U/L 100   ALT (SGPT) U/L 18   AST (SGOT) U/L 17       Results from last 7 days  Lab Units 10/13/18  0422 10/13/18  0421 10/12/18  0230   TSH mIU/mL 2.162  --   --    HEMOGLOBIN A1C % 5.70*  --   --    BNP pg/mL  --  1,209.0* 2,091.0*     Brief Urine Lab Results     None        Microbiology Results Abnormal     None        Imaging Results (all)     Procedure Component Value Units Date/Time    XR Chest 1 View [[id number]] Collected:  10/12/18 0209     Updated:  10/12/18 0308    Narrative:       EXAM:  XR Chest, 1 View    CLINICAL HISTORY:  78 years old, male; Pain; Chest pain; Left-sided chest pain; Additional info:   Chest pain/dyspnea    TECHNIQUE:  Frontal view of the chest.    COMPARISON:  No relevant prior studies available.    FINDINGS:  Lungs:  Left basilar opacity, likely atelectasis, although left lower   lobe/retrocardiac pneumonia possible.  Pleural space:  Small right pleural effusion with associated right basilar   atelectasis.  No pneumothorax.  Heart:  Cardiac silhouette is enlarged, compatible with cardiomegaly and/or   pericardial fluid.  Mediastinum:  Normal.  Bones/joints:  Changes of prior sternotomy.  Multilevel thoracic spine   degenerative changes.  Vasculature:  Atherosclerotic ossification of the thoracic aorta.      Impression:       1.  Small right pleural effusion with associated right basilar atelectasis.    2.   Left basilar opacity, likely atelectasis, although left lower   lobe/retrocardiac pneumonia possible.  Recommend followup.    3.  Incidental/non-acute findings are described above.    THIS DOCUMENT HAS BEEN ELECTRONICALLY SIGNED BY ALEXSANDER MORENO MD        Results for orders placed during the hospital encounter of 10/12/18   Adult Transthoracic Echo Complete W/ Cont if Necessary Per Protocol    Narrative · Estimated EF = 25-30%. Global hypokinesis  · Mild mitral valve regurgitation is present  · Mild tricuspid valve regurgitation is present.  · Calculated right ventricular systolic pressure from tricuspid   regurgitation is 37 mmHg.  · Mildly reduced right ventricular systolic function noted.        Discharge Details        Discharge Medications      New Medications      Instructions Start Date   acetaminophen 325 MG tablet  Commonly known as:  TYLENOL   650 mg, Oral, Every 4 Hours PRN      aspirin 81 MG EC tablet   81 mg, Oral, Daily      carvedilol 6.25 MG tablet  Commonly known as:  COREG   6.25 mg, Oral, Every 12 Hours Scheduled      miconazole 2 % cream  Commonly known as:  MICOTIN   Apply to bilateral groin twice daily      sacubitril-valsartan 24-26 MG tablet  Commonly known as:  ENTRESTO   1 tablet, Oral, Every 12 Hours Scheduled         Changes to Medications      Instructions Start Date   furosemide 40 MG tablet  Commonly known as:  LASIX  What changed:  · medication strength  · how much to take   40 mg, Oral, Daily         Continue These Medications      Instructions Start Date   atorvastatin 40 MG tablet  Commonly known as:  LIPITOR   40 mg, Oral, Daily      citalopram 20 MG tablet  Commonly known as:  CeleXA   20 mg, Oral, Daily      DOCUSATE SODIUM PO   200 mg, Oral, Daily      fludrocortisone 0.1 MG tablet   0.1 mg, Oral, Daily      pantoprazole 40 MG EC tablet  Commonly known as:  PROTONIX   40 mg, Oral, Daily      Propylene Glycol 0.6 % solution   0.6 %, Ophthalmic, 3 Times Daily           Discharge  Disposition:  Home or Self Care    Discharge Diet:  Diet Instructions     Diet: Regular, Cardiac; Thin       Discharge Diet:   Regular  Cardiac       Fluid Consistency:  Thin        Discharge Activity:   Activity Instructions     Activity as Tolerated           Code Status/Level of Support:  Code Status and Medical Interventions:   Ordered at: 10/12/18 0536     Level Of Support Discussed With:    Patient     Code Status:    CPR     Medical Interventions (Level of Support Prior to Arrest):    Full     No future appointments.    Time Spent on Discharge:  40 minutes    Electronically signed by Monie Lopez PA-C, 10/16/18, 12:21 PM.        Electronically signed by Monie Lopez PA-C at 10/16/2018 12:23 PM

## 2018-10-16 NOTE — PROGRESS NOTES
Continued Stay Note  [facility]     Patient Name: Edwardo Diggs  MRN: [id number]  Today's Date: 10/16/2018    Admit Date: 10/12/2018          Discharge Plan     Row Name 10/16/18 0956       Plan    Plan SW attempted to call pt's son again this morning to discuss discharge planning.  SW had to leave a second message requesting call back.              Discharge Codes    No documentation.           TATO Campos

## 2018-10-17 ENCOUNTER — READMISSION MANAGEMENT (OUTPATIENT)
Dept: CALL CENTER | Facility: HOSPITAL | Age: 78
End: 2018-10-17

## 2018-10-17 NOTE — OUTREACH NOTE
Prep Survey      Responses   Facility patient discharged from?  [facility]   Is patient eligible?  No   What are the reasons patient is not eligible?  [facility]   Does the patient have one of the following disease processes/diagnoses(primary or secondary)?  CHF   Prep survey completed?  Yes          Lu Montemayor RN

## 2018-11-09 ENCOUNTER — APPOINTMENT (OUTPATIENT)
Dept: CT IMAGING | Facility: HOSPITAL | Age: 78
End: 2018-11-09

## 2018-11-09 ENCOUNTER — HOSPITAL ENCOUNTER (OUTPATIENT)
Facility: HOSPITAL | Age: 78
Setting detail: OBSERVATION
LOS: 1 days | Discharge: NURSING, REHAB OR LONG-TERM CARE | End: 2018-11-14
Attending: EMERGENCY MEDICINE | Admitting: FAMILY MEDICINE

## 2018-11-09 DIAGNOSIS — I95.9 HYPOTENSION, UNSPECIFIED HYPOTENSION TYPE: ICD-10-CM

## 2018-11-09 DIAGNOSIS — Z74.09 IMPAIRED FUNCTIONAL MOBILITY, BALANCE, GAIT, AND ENDURANCE: ICD-10-CM

## 2018-11-09 DIAGNOSIS — R53.1 GENERALIZED WEAKNESS: ICD-10-CM

## 2018-11-09 DIAGNOSIS — N39.0 ACUTE UTI: Primary | ICD-10-CM

## 2018-11-09 DIAGNOSIS — Z74.09 IMPAIRED MOBILITY AND ADLS: ICD-10-CM

## 2018-11-09 DIAGNOSIS — Z78.9 IMPAIRED MOBILITY AND ADLS: ICD-10-CM

## 2018-11-09 PROBLEM — I95.1 ORTHOSTATIC HYPOTENSION: Status: ACTIVE | Noted: 2018-11-09
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
## 2018-11-09 LAB
ANION GAP SERPL CALCULATED.3IONS-SCNC: 3 MMOL/L (ref 3–11)
BACTERIA UR QL AUTO: ABNORMAL /HPF
BASOPHILS # BLD AUTO: 0.02 10*3/MM3 (ref 0–0.2)
BASOPHILS NFR BLD AUTO: 0.2 % (ref 0–1)
BILIRUB UR QL STRIP: NEGATIVE
BNP SERPL-MCNC: 2726 PG/ML (ref 0–100)
BUN BLD-MCNC: 20 MG/DL (ref 9–23)
BUN/CREAT SERPL: 19.8 (ref 7–25)
CALCIUM SPEC-SCNC: 8.7 MG/DL (ref 8.7–10.4)
CHLORIDE SERPL-SCNC: 103 MMOL/L (ref 99–109)
CLARITY UR: ABNORMAL
CO2 SERPL-SCNC: 34 MMOL/L (ref 20–31)
COLOR UR: YELLOW
CREAT BLD-MCNC: 1.01 MG/DL (ref 0.6–1.3)
D-LACTATE SERPL-SCNC: 0.6 MMOL/L (ref 0.5–2)
DEPRECATED RDW RBC AUTO: 52.4 FL (ref 37–54)
EOSINOPHIL # BLD AUTO: 0.03 10*3/MM3 (ref 0–0.3)
EOSINOPHIL NFR BLD AUTO: 0.3 % (ref 0–3)
ERYTHROCYTE [DISTWIDTH] IN BLOOD BY AUTOMATED COUNT: 16.8 % (ref 11.3–14.5)
GFR SERPL CREATININE-BSD FRML MDRD: 71 ML/MIN/1.73
GLUCOSE BLD-MCNC: 87 MG/DL (ref 70–100)
GLUCOSE UR STRIP-MCNC: NEGATIVE MG/DL
HCT VFR BLD AUTO: 37.3 % (ref 38.9–50.9)
HGB BLD-MCNC: 11.4 G/DL (ref 13.1–17.5)
HGB UR QL STRIP.AUTO: ABNORMAL
HYALINE CASTS UR QL AUTO: ABNORMAL /LPF
IMM GRANULOCYTES # BLD: 0.02 10*3/MM3 (ref 0–0.03)
IMM GRANULOCYTES NFR BLD: 0.2 % (ref 0–0.6)
KETONES UR QL STRIP: NEGATIVE
LEUKOCYTE ESTERASE UR QL STRIP.AUTO: ABNORMAL
LYMPHOCYTES # BLD AUTO: 1.08 10*3/MM3 (ref 0.6–4.8)
LYMPHOCYTES NFR BLD AUTO: 10.2 % (ref 24–44)
MCH RBC QN AUTO: 26 PG (ref 27–31)
MCHC RBC AUTO-ENTMCNC: 30.6 G/DL (ref 32–36)
MCV RBC AUTO: 85.2 FL (ref 80–99)
MONOCYTES # BLD AUTO: 0.96 10*3/MM3 (ref 0–1)
MONOCYTES NFR BLD AUTO: 9.1 % (ref 0–12)
NEUTROPHILS # BLD AUTO: 8.48 10*3/MM3 (ref 1.5–8.3)
NEUTROPHILS NFR BLD AUTO: 80.2 % (ref 41–71)
NITRITE UR QL STRIP: POSITIVE
PH UR STRIP.AUTO: 6 [PH] (ref 5–8)
PLATELET # BLD AUTO: 158 10*3/MM3 (ref 150–450)
PMV BLD AUTO: 10.8 FL (ref 6–12)
POTASSIUM BLD-SCNC: 3.5 MMOL/L (ref 3.5–5.5)
PROT UR QL STRIP: ABNORMAL
RBC # BLD AUTO: 4.38 10*6/MM3 (ref 4.2–5.76)
RBC # UR: ABNORMAL /HPF
REF LAB TEST METHOD: ABNORMAL
SODIUM BLD-SCNC: 140 MMOL/L (ref 132–146)
SP GR UR STRIP: 1.01 (ref 1–1.03)
SQUAMOUS #/AREA URNS HPF: ABNORMAL /HPF
TROPONIN I SERPL-MCNC: 0.02 NG/ML
UROBILINOGEN UR QL STRIP: ABNORMAL
WBC NRBC COR # BLD: 10.57 10*3/MM3 (ref 3.5–10.8)
WBC UR QL AUTO: ABNORMAL /HPF

## 2018-11-09 PROCEDURE — G0378 HOSPITAL OBSERVATION PER HR: HCPCS

## 2018-11-09 PROCEDURE — 83605 ASSAY OF LACTIC ACID: CPT | Performed by: EMERGENCY MEDICINE

## 2018-11-09 PROCEDURE — 99220 PR INITIAL OBSERVATION CARE/DAY 70 MINUTES: CPT | Performed by: FAMILY MEDICINE

## 2018-11-09 PROCEDURE — 87186 SC STD MICRODIL/AGAR DIL: CPT | Performed by: EMERGENCY MEDICINE

## 2018-11-09 PROCEDURE — 81001 URINALYSIS AUTO W/SCOPE: CPT | Performed by: EMERGENCY MEDICINE

## 2018-11-09 PROCEDURE — 99285 EMERGENCY DEPT VISIT HI MDM: CPT

## 2018-11-09 PROCEDURE — 87077 CULTURE AEROBIC IDENTIFY: CPT | Performed by: EMERGENCY MEDICINE

## 2018-11-09 PROCEDURE — 74176 CT ABD & PELVIS W/O CONTRAST: CPT

## 2018-11-09 PROCEDURE — 70450 CT HEAD/BRAIN W/O DYE: CPT

## 2018-11-09 PROCEDURE — 80048 BASIC METABOLIC PNL TOTAL CA: CPT | Performed by: EMERGENCY MEDICINE

## 2018-11-09 PROCEDURE — 83880 ASSAY OF NATRIURETIC PEPTIDE: CPT | Performed by: EMERGENCY MEDICINE

## 2018-11-09 PROCEDURE — 85025 COMPLETE CBC W/AUTO DIFF WBC: CPT | Performed by: EMERGENCY MEDICINE

## 2018-11-09 PROCEDURE — 87086 URINE CULTURE/COLONY COUNT: CPT | Performed by: EMERGENCY MEDICINE

## 2018-11-09 PROCEDURE — 84484 ASSAY OF TROPONIN QUANT: CPT | Performed by: EMERGENCY MEDICINE

## 2018-11-09 PROCEDURE — 25010000002 CEFTRIAXONE PER 250 MG: Performed by: EMERGENCY MEDICINE

## 2018-11-09 PROCEDURE — 96365 THER/PROPH/DIAG IV INF INIT: CPT

## 2018-11-09 RX ORDER — CEFTRIAXONE SODIUM 1 G/50ML
1 INJECTION, SOLUTION INTRAVENOUS EVERY 24 HOURS
Status: DISCONTINUED | OUTPATIENT
Start: 2018-11-10 | End: 2018-11-14

## 2018-11-09 RX ORDER — ACETAMINOPHEN 325 MG/1
650 TABLET ORAL EVERY 4 HOURS PRN
Status: DISCONTINUED | OUTPATIENT
Start: 2018-11-09 | End: 2018-11-14 | Stop reason: HOSPADM

## 2018-11-09 RX ORDER — CEFTRIAXONE SODIUM 1 G/50ML
1 INJECTION, SOLUTION INTRAVENOUS ONCE
Status: COMPLETED | OUTPATIENT
Start: 2018-11-09 | End: 2018-11-09

## 2018-11-09 RX ORDER — CARVEDILOL 6.25 MG/1
6.25 TABLET ORAL EVERY 12 HOURS SCHEDULED
Status: DISCONTINUED | OUTPATIENT
Start: 2018-11-09 | End: 2018-11-14 | Stop reason: HOSPADM

## 2018-11-09 RX ORDER — FUROSEMIDE 40 MG/1
40 TABLET ORAL DAILY
Status: CANCELLED | OUTPATIENT
Start: 2018-11-10

## 2018-11-09 RX ORDER — SODIUM CHLORIDE 0.9 % (FLUSH) 0.9 %
3-10 SYRINGE (ML) INJECTION AS NEEDED
Status: DISCONTINUED | OUTPATIENT
Start: 2018-11-09 | End: 2018-11-14 | Stop reason: HOSPADM

## 2018-11-09 RX ORDER — DOCUSATE SODIUM 100 MG/1
200 CAPSULE, LIQUID FILLED ORAL DAILY
COMMUNITY

## 2018-11-09 RX ORDER — FUROSEMIDE 40 MG/1
40 TABLET ORAL DAILY
Status: DISCONTINUED | OUTPATIENT
Start: 2018-11-10 | End: 2018-11-14 | Stop reason: HOSPADM

## 2018-11-09 RX ORDER — PANTOPRAZOLE SODIUM 40 MG/1
40 TABLET, DELAYED RELEASE ORAL DAILY
Status: DISCONTINUED | OUTPATIENT
Start: 2018-11-10 | End: 2018-11-14 | Stop reason: HOSPADM

## 2018-11-09 RX ORDER — SENNA AND DOCUSATE SODIUM 50; 8.6 MG/1; MG/1
2 TABLET, FILM COATED ORAL NIGHTLY
Status: DISCONTINUED | OUTPATIENT
Start: 2018-11-09 | End: 2018-11-14 | Stop reason: HOSPADM

## 2018-11-09 RX ORDER — SODIUM CHLORIDE 0.9 % (FLUSH) 0.9 %
3 SYRINGE (ML) INJECTION EVERY 12 HOURS SCHEDULED
Status: DISCONTINUED | OUTPATIENT
Start: 2018-11-09 | End: 2018-11-14 | Stop reason: HOSPADM

## 2018-11-09 RX ORDER — ATORVASTATIN CALCIUM 40 MG/1
40 TABLET, FILM COATED ORAL DAILY
Status: DISCONTINUED | OUTPATIENT
Start: 2018-11-10 | End: 2018-11-14 | Stop reason: HOSPADM

## 2018-11-09 RX ORDER — BISACODYL 5 MG/1
5 TABLET, DELAYED RELEASE ORAL DAILY PRN
Status: DISCONTINUED | OUTPATIENT
Start: 2018-11-09 | End: 2018-11-14 | Stop reason: HOSPADM

## 2018-11-09 RX ORDER — ASPIRIN 81 MG/1
81 TABLET ORAL DAILY
Status: DISCONTINUED | OUTPATIENT
Start: 2018-11-10 | End: 2018-11-14 | Stop reason: HOSPADM

## 2018-11-09 RX ORDER — HEPARIN SODIUM 5000 [USP'U]/ML
5000 INJECTION, SOLUTION INTRAVENOUS; SUBCUTANEOUS EVERY 12 HOURS SCHEDULED
Status: DISCONTINUED | OUTPATIENT
Start: 2018-11-10 | End: 2018-11-14 | Stop reason: HOSPADM

## 2018-11-09 RX ORDER — FLUDROCORTISONE ACETATE 0.1 MG/1
0.1 TABLET ORAL DAILY
Status: DISCONTINUED | OUTPATIENT
Start: 2018-11-10 | End: 2018-11-14 | Stop reason: HOSPADM

## 2018-11-09 RX ORDER — CITALOPRAM 20 MG/1
20 TABLET ORAL DAILY
Status: DISCONTINUED | OUTPATIENT
Start: 2018-11-10 | End: 2018-11-14 | Stop reason: HOSPADM

## 2018-11-09 RX ADMIN — CEFTRIAXONE SODIUM 1 G: 1 INJECTION, SOLUTION INTRAVENOUS at 15:08

## 2018-11-09 NOTE — ED PROVIDER NOTES
Subjective   Edwardo Diggs is a 78 y.o.male who presents to the ED via EMS from [facility] for hypotension. EMS states that the staff at [facility] reported the patient to have a blood pressure of 73/45. The patient says that he feels bad, but that he has felt bad for quite some time. He complains of back pain and chest pain which is unchanged from his baseline. He has bruising on his face that he attributes to a fall a few days ago. He was not seen by a hospital for this fall. He complains of a headache and says that he had some spells of dizziness upon standing up this morning. He also says that he is more short of breath than usual. He denies having any nausea or abdominal pain. He says he is uncomfortable being in the ED and that he is here because he was sent. He has a hx of a 5 CABG. He says that he went to breakfast in a wheel chair this morning prior to being sent to the ED. However, he says that he is able to walk and does so frequently. There are no other acute complaints at this time.         History provided by:  Patient and EMS personnel  Illness   Location:  Generalized  Quality:  Hypotension  Severity:  Severe  Onset quality:  Unable to specify  Timing:  Constant  Progression:  Improving  Chronicity:  New  Context:  Staff at [facility] recorded BP of 73/45  Worsened by:  Nothing  Associated symptoms: chest pain (chronic and unchanged), headaches and shortness of breath    Associated symptoms: no abdominal pain and no nausea        Review of Systems   Respiratory: Positive for shortness of breath.    Cardiovascular: Positive for chest pain (chronic and unchanged).   Gastrointestinal: Negative for abdominal pain and nausea.   Musculoskeletal: Positive for back pain (chronic and unchanged).   Skin: Positive for wound (bruising to face).   Neurological: Positive for dizziness and headaches.   All other systems reviewed and are negative.      Past Medical History:   Diagnosis Date   • Anemia    •  Constipation    • Depression    • Dermatitis    • GERD (gastroesophageal reflux disease)    • Hyperlipidemia    • Orthostatic hypotension    • Parkinson's disease (CMS/HCC)    • Tinea corporis        Allergies   Allergen Reactions   • Eggs Or Egg-Derived Products Unknown (See Comments)       Past Surgical History:   Procedure Laterality Date   • CATARACT EXTRACTION     • CORONARY ARTERY BYPASS GRAFT         History reviewed. No pertinent family history.    Social History     Social History   • Marital status:      Social History Main Topics   • Smoking status: Former Smoker   • Smokeless tobacco: Never Used   • Alcohol use No   • Drug use: No   • Sexual activity: Defer     Other Topics Concern   • Not on file         Objective   Physical Exam   Constitutional: He is oriented to person, place, and time. He appears well-developed and well-nourished. No distress.   HENT:   Head: Normocephalic.   Nose: Nose normal.   He has old-appearing bruising to the left side of his face.    Eyes: Conjunctivae are normal. No scleral icterus.   Neck: Normal range of motion. Neck supple.   Cardiovascular: Normal rate, regular rhythm and normal heart sounds.    No murmur heard.  Pulmonary/Chest: Effort normal and breath sounds normal. No respiratory distress.   Abdominal: Soft. Bowel sounds are normal. There is tenderness (mild diffuse). There is no rebound and no guarding.   Musculoskeletal: Normal range of motion. He exhibits no edema.   Neurological: He is alert and oriented to person, place, and time.   Skin: Skin is warm and dry.   Psychiatric: He has a normal mood and affect. His behavior is normal.   Nursing note and vitals reviewed.      Procedures         ED Course     Report of hypotension, none during ED stay.  UA positive for infection.  CT shows bladder wall thickening c/w infection.  CT head negative.  Labs benign.  Patient stable on serial rechecks.  Discussed exam findings, test results so far and concerns in  detail at the bedside.  Discussed need for admission for further evaluation and treatment.                  MDM  Number of Diagnoses or Management Options  Acute UTI:   Generalized weakness:   Hypotension, unspecified hypotension type:      Amount and/or Complexity of Data Reviewed  Clinical lab tests: ordered and reviewed  Tests in the radiology section of CPT®: ordered and reviewed  Decide to obtain previous medical records or to obtain history from someone other than the patient: yes  Discuss the patient with other providers: yes  Independent visualization of images, tracings, or specimens: yes        Final diagnoses:   Acute UTI   Hypotension, unspecified hypotension type   Generalized weakness       Documentation assistance provided by anita Medina.  Information recorded by the anita was done at my direction and has been verified and validated by me.     Edgardo Medina  11/09/18 1248       Edgardo Medina  11/09/18 1253       Edgardo Medina  11/09/18 0263       Matthew Crouch MD  11/09/18 6397

## 2018-11-09 NOTE — H&P
[facility] Medicine Services  HISTORY AND PHYSICAL    Patient Name: Edwardo Diggs  : 1940  MRN: [id number]  Primary Care Physician: Provider, No Known  Date of admission: 2018      Subjective   Subjective     Chief Complaint:  Weakness    HPI:  Edwardo Diggs is a 78 y.o. male  with PMH significant for dementia, HTN, hyperlipidemia, CAD (s/p CABG x 5), chronic systolic heart failure, orthostatic hypotension and COPD on 2L NC at all times. He is a resident of [facility].Patient was recently discharged from [facility] on 10/16/18 for acute on chronic systolic and diastolic heart failure. He was brought to [facility] ED with c/o feeling weak for a few days with recent fall, and reported to have had a blood pressure of 73/45 at SNF. Patient denies dysuria, hematuria, abd pain. Currently blood pressure has been stable and normotensive since arrival. Regarding recent fall, patient denies presyncope and states it was a mechanical fall, and he did not obtain medical treatment, though hitting his head on the floor.  In ED found to have high normal WBC of 10.5, elevated BNP of 2.7k, and urinalysis + for UTI. CT head negative for acute intracranial process. CT abd with moderate bladder distention without hydronephrosis or obstructive process. Will be admitted to hospital medicine for further evaluation.       Review of Systems   Constitutional: Negative for activity change, appetite change, chills, fatigue and fever.   HENT: Negative for facial swelling, sore throat, trouble swallowing and voice change.    Eyes: Negative for photophobia, redness and visual disturbance.   Respiratory: Negative for cough, shortness of breath and wheezing.    Cardiovascular: Negative for chest pain, palpitations and leg swelling.   Gastrointestinal: Negative for abdominal distention, abdominal pain, constipation, diarrhea, nausea and vomiting.   Endocrine: Negative for cold intolerance and heat  intolerance.   Genitourinary: Negative for difficulty urinating, dysuria, frequency and hematuria.   Musculoskeletal: Negative for arthralgias, back pain and gait problem.   Skin: Positive for wound (left periorbital bruising ). Negative for color change, pallor and rash.   Neurological: Positive for weakness. Negative for dizziness, syncope, facial asymmetry, speech difficulty, light-headedness, numbness and headaches.   Hematological: Negative for adenopathy. Does not bruise/bleed easily.   Psychiatric/Behavioral: Positive for confusion. Negative for agitation and behavioral problems.      Otherwise 10-system ROS reviewed and is negative except as mentioned in the HPI.    Personal History     Past Medical History:   Diagnosis Date   • Anemia    • Constipation    • Depression    • Dermatitis    • GERD (gastroesophageal reflux disease)    • Hyperlipidemia    • Orthostatic hypotension    • Parkinson's disease (CMS/HCC)    • Tinea corporis        Past Surgical History:   Procedure Laterality Date   • CATARACT EXTRACTION     • CORONARY ARTERY BYPASS GRAFT         Family History: family history is not on file.     Social History:  reports that he has quit smoking. He has never used smokeless tobacco. He reports that he does not drink alcohol or use drugs.  Social History     Social History Narrative   • No narrative on file       Medications:  Available home medication information reviewed     Allergies   Allergen Reactions   • Eggs Or Egg-Derived Products Unknown (See Comments)       Objective   Objective     Vital Signs:   Temp:  [97.5 °F (36.4 °C)] 97.5 °F (36.4 °C)  Heart Rate:  [72-83] 83  Resp:  [16] 16  BP: (101-144)/(62-91) 144/79        Physical Exam   Constitutional: No acute distress, awake, alert  Eyes: PERRLA, sclerae anicteric, no conjunctival injection  HENT: NC, left > right periorbital bruising, mucous membranes moist  Neck: Supple, no thyromegaly, no lymphadenopathy, trachea midline  Respiratory: Clear  to auscultation bilaterally, nonlabored respirations   Cardiovascular: RRR, + murmurs, no rubs or gallops, + palpable pedal pulses bilaterally  Gastrointestinal: Positive bowel sounds, soft, nontender, nondistended  Musculoskeletal: No bilateral ankle edema, no clubbing or cyanosis to extremities  Psychiatric: Appropriate affect, cooperative  Neurologic: Oriented to person/ place; confused to some details, strength symmetric in all extremities, Cranial Nerves grossly intact to confrontation, speech clear  Skin: No rashes     Results Reviewed:  I have personally reviewed current lab, radiology, and data and agree.      Results from last 7 days  Lab Units 11/09/18  1257   WBC 10*3/mm3 10.57   HEMOGLOBIN g/dL 11.4*   HEMATOCRIT % 37.3*   PLATELETS 10*3/mm3 158       Results from last 7 days  Lab Units 11/09/18  1257   SODIUM mmol/L 140   POTASSIUM mmol/L 3.5   CHLORIDE mmol/L 103   CO2 mmol/L 34.0*   BUN mg/dL 20   CREATININE mg/dL 1.01   GLUCOSE mg/dL 87   CALCIUM mg/dL 8.7   TROPONIN I ng/mL 0.024     Estimated Creatinine Clearance: 65.5 mL/min (by C-G formula based on SCr of 1.01 mg/dL).  Brief Urine Lab Results  (Last result in the past 365 days)      Color   Clarity   Blood   Leuk Est   Nitrite   Protein   CREAT   Urine HCG        11/09/18 1406 Yellow Turbid(A) Moderate (2+)(A) Large (3+)(A) Positive(A) 100 mg/dL (2+)(A)             BNP   Date Value Ref Range Status   11/09/2018 2,726.0 (H) 0.0 - 100.0 pg/mL Final     Comment:     Results may be falsely decreased if patient taking Biotin.     Imaging Results (last 24 hours)     Procedure Component Value Units Date/Time    CT Abdomen Pelvis Without Contrast [[id number]] Collected:  11/09/18 1345     Updated:  11/09/18 1349    Narrative:       EXAMINATION: CT ABDOMEN PELVIS WO CONTRAST-      INDICATION: diffuse abd tenderness, dementia      TECHNIQUE: CT abdomen pelvis without intravenous contrast administration     The radiation dose reduction device was turned on  for each scan per the  ALARA (As Low as Reasonably Achievable) protocol.     COMPARISON: NONE     FINDINGS: Lung bases demonstrate small right and trace left pleural  effusions with adjacent subsegmental atelectasis. Liver without focal  lesion. Gallbladder demonstrates contracted appearance with several  calcific densities in the dependent portion indicating cholelithiasis.  No gross intrahepatic biliary or extra hepatic biliary dilatation.  Pancreas and spleen grossly unremarkable. Adrenals have questionable  thickening bilaterally however no distinct nodule. Kidneys without  hydronephrosis or hydroureter demonstrating a 2 mm nonobstructing left  superior pole renal calculus. No obstructive uropathy or urolithiasis is  otherwise evident. Atherosclerotic nonaneurysmal abdominal aorta. No  bulky retroperitoneal adenopathy. GI tract evaluation demonstrates small  hiatal hernia however no disproportionate dilatation of bowel to suggest  mechanical obstructive process. Sigmoid diverticulosis without evidence  for acute diverticulitis. No loculated intra-abdominal fluid collection  with scattered mesenteric edema however no overt ascites. Pelvic viscera  demonstrate mild to moderate distention of the urinary bladder with  circumferential thickening of the bladder wall and surrounding stranding  concerning for cystitis. No loculated pelvic collection or bulky pelvic  adenopathy otherwise. Degenerative changes of the spine without  aggressive osseous or soft tissue body wall lesions.             Impression:       1. Mild to moderate distention of the urinary bladder with  circumferential bladder wall thickening and surrounding inflammatory  stranding concerning for cystitis without bladder calculi identified or  obstructive uropathy otherwise noted. No hydronephrosis.  2. Sigmoid diverticulosis without evidence for acute diverticulitis.  3. Small right and trace left pleural effusions with adjacent  atelectasis.           CT Head Without Contrast [[id number]] Collected:  11/09/18 1344     Updated:  11/09/18 1345    Narrative:       EXAMINATION: CT HEAD WO CONTRAST-      INDICATION: fall days ago, L sided facial injuries      TECHNIQUE: Axial CT of the head without intravenous contrast  ministration     The radiation dose reduction device was turned on for each scan per the  ALARA (As Low as Reasonably Achievable) protocol.     COMPARISON: NONE     FINDINGS:   Structures are without midline shift or mass effect. No hydrocephalus.  Large area of encephalomalacia from prior insult right frontotemporal  region and left anterior frontal regions without acute intracranial  hemorrhage or extra-axial fluid collection. Basal cisterns are patent.  Globes and orbits unremarkable without intraconal abnormality or  disruption of the globes. Visual is paranasal sinuses and mastoid air  cells demonstrate minimal mucosal circumferential thickening of the  right maxillary sinus otherwise grossly clear well-pneumatized.  Calvarium intact without depressed calvarial fracture.             Impression:       No acute intracranial abnormality or evidence for depressed  calvarial fracture with calvarium intact. Noted by frontal and right  frontotemporal areas of encephalomalacia from prior insult without acute  intra-axial hemorrhage or extra-axial fluid collection.              Results for orders placed during the hospital encounter of 10/12/18   Adult Transthoracic Echo Complete W/ Cont if Necessary Per Protocol    Narrative · Estimated EF = 25-30%. Global hypokinesis  · Mild mitral valve regurgitation is present  · Mild tricuspid valve regurgitation is present.  · Calculated right ventricular systolic pressure from tricuspid   regurgitation is 37 mmHg.  · Mildly reduced right ventricular systolic function noted.          Assessment/Plan   Assessment / Plan     Active Hospital Problems    Diagnosis   • UTI (urinary tract infection)   • Orthostatic  hypotension   • Fall   • Chronic systolic CHF (congestive heart failure) (CMS/[facility])   • Dyslipidemia   • CAD (coronary artery disease)     1. H/O [place] CABG x 5     • Anemia         Assessment & Plan:      UTI  --cont IV rocephin, monitor cultures     Chronic orthostatic Hypotension  --reported blood pressure of 73/45 at [facility] early today, BP's since here normotensive  --cont Florinef   --currently stable but cont to monitor, ?Related to recent fall at SNF?  --will order orthostatics     Fall  --? Related to orthostatic hypotension  --patient did not seek medical treatment but states it was mechanical and was not presyncopal   --CT head today without acute changes but noted old right and left frontal regions of encephalomalacia from previous insult  --cont Florinef, consider changes in dosing if needed   --orthostatic BP's BID    Chronic SHF  --ECHO from 10/18 with EF 25%  --BNP elevated to 2.7k --> chronically elevated but increased from his baseline however no CHF exac sx's  --cont Entresto/ Lasix/ Coreg as tolerated and monitor BP closely, has been normotensive since arrival in ED     Anemia  --H/H currently stable at 11.4/ 37.3  --monitor    HLD  --cont statin     CAD  --ASA     DVT prophylaxis: Heparin     CODE STATUS:    Code Status and Medical Interventions:   Ordered at: 11/09/18 7394     Level Of Support Discussed With:    Patient     Code Status:    CPR     Medical Interventions (Level of Support Prior to Arrest):    Full       Admission Status:  I believe this patient meets INPATIENT status due to the need for care which can only be reasonably provided in an hospital setting such as aggressive/expedited ancillary services and/or consultation services, the necessity for IV medications, close physician monitoring.  In such, I feel patient’s risk for adverse outcomes and need for care warrant INPATIENT evaluation and predict the patient’s care encounter to likely last beyond 2  midnights.      Electronically signed by MARCOS Garcia, 11/09/18, 4:48 PM.        Brief Attending Admission Attestation     I have seen and examined the patient, performing an independent face-to-face diagnostic evaluation with plan of care reviewed and developed with the advanced practice clinician (APC).      Brief Summary Statement/HPI:   Edwardo Diggs is a 78 y.o. male with PMHx of dementia, HTN, hyperlipidemia, CAD (s/p CABG x 5), chronic systolic heart failure, orthostatic hypotension and COPD on 2L NC.  Brought to [facility] ED from [facility] for ~2-3 days of generalized weakness and mechanical fall x1 with resultant left periorbital ecchymosis.  Today BP was taken by SNF reading 70's/40's.  In ED, labs show (+) UTI and elevated BNP (chronically elevated but increased from his baseline however no CHF exac sx's).  Once to floor, noted to now have temp 100.5, SBP's since arrival here have been 100-140's.      Attending Physical Exam:  Constitutional: No acute distress, asleep quietly, nontoxic, normal body habitus  Eyes: left periorbital ecchymosis  Respiratory: Clear to auscultation bilaterally, no crackles, nonlabored respirations   Cardiovascular: RRR, HR 80's  Gastrointestinal: Soft, no grimace, nondistended  Musculoskeletal: No pitting peripheral edema, normal muscle tone for age  Skin: No mottling      Assessment/Plan :  See above section for further detailed assessment and plan developed with APC which I have reviewed and/or edited.      Electronically signed by Santa Perez MD, 11/09/18, 9:45 PM.

## 2018-11-10 LAB
ANION GAP SERPL CALCULATED.3IONS-SCNC: 8 MMOL/L (ref 3–11)
BASOPHILS # BLD AUTO: 0.02 10*3/MM3 (ref 0–0.2)
BASOPHILS NFR BLD AUTO: 0.2 % (ref 0–1)
BUN BLD-MCNC: 20 MG/DL (ref 9–23)
BUN/CREAT SERPL: 26 (ref 7–25)
CALCIUM SPEC-SCNC: 8.4 MG/DL (ref 8.7–10.4)
CHLORIDE SERPL-SCNC: 107 MMOL/L (ref 99–109)
CO2 SERPL-SCNC: 30 MMOL/L (ref 20–31)
CREAT BLD-MCNC: 0.77 MG/DL (ref 0.6–1.3)
DEPRECATED RDW RBC AUTO: 51.9 FL (ref 37–54)
EOSINOPHIL # BLD AUTO: 0.03 10*3/MM3 (ref 0–0.3)
EOSINOPHIL NFR BLD AUTO: 0.4 % (ref 0–3)
ERYTHROCYTE [DISTWIDTH] IN BLOOD BY AUTOMATED COUNT: 16.7 % (ref 11.3–14.5)
GFR SERPL CREATININE-BSD FRML MDRD: 97 ML/MIN/1.73
GLUCOSE BLD-MCNC: 84 MG/DL (ref 70–100)
HCT VFR BLD AUTO: 35.8 % (ref 38.9–50.9)
HGB BLD-MCNC: 10.9 G/DL (ref 13.1–17.5)
IMM GRANULOCYTES # BLD: 0.01 10*3/MM3 (ref 0–0.03)
IMM GRANULOCYTES NFR BLD: 0.1 % (ref 0–0.6)
LYMPHOCYTES # BLD AUTO: 1.13 10*3/MM3 (ref 0.6–4.8)
LYMPHOCYTES NFR BLD AUTO: 13.3 % (ref 24–44)
MCH RBC QN AUTO: 25.8 PG (ref 27–31)
MCHC RBC AUTO-ENTMCNC: 30.4 G/DL (ref 32–36)
MCV RBC AUTO: 84.6 FL (ref 80–99)
MONOCYTES # BLD AUTO: 0.7 10*3/MM3 (ref 0–1)
MONOCYTES NFR BLD AUTO: 8.3 % (ref 0–12)
NEUTROPHILS # BLD AUTO: 6.6 10*3/MM3 (ref 1.5–8.3)
NEUTROPHILS NFR BLD AUTO: 77.8 % (ref 41–71)
PLATELET # BLD AUTO: 168 10*3/MM3 (ref 150–450)
PMV BLD AUTO: 10.7 FL (ref 6–12)
POTASSIUM BLD-SCNC: 3.6 MMOL/L (ref 3.5–5.5)
RBC # BLD AUTO: 4.23 10*6/MM3 (ref 4.2–5.76)
SODIUM BLD-SCNC: 145 MMOL/L (ref 132–146)
WBC NRBC COR # BLD: 8.48 10*3/MM3 (ref 3.5–10.8)

## 2018-11-10 PROCEDURE — 80048 BASIC METABOLIC PNL TOTAL CA: CPT | Performed by: NURSE PRACTITIONER

## 2018-11-10 PROCEDURE — 25010000002 HEPARIN (PORCINE) PER 1000 UNITS: Performed by: NURSE PRACTITIONER

## 2018-11-10 PROCEDURE — 25010000002 CEFTRIAXONE PER 250 MG: Performed by: NURSE PRACTITIONER

## 2018-11-10 PROCEDURE — 96372 THER/PROPH/DIAG INJ SC/IM: CPT

## 2018-11-10 PROCEDURE — 85025 COMPLETE CBC W/AUTO DIFF WBC: CPT | Performed by: NURSE PRACTITIONER

## 2018-11-10 PROCEDURE — G0378 HOSPITAL OBSERVATION PER HR: HCPCS

## 2018-11-10 PROCEDURE — 99225 PR SBSQ OBSERVATION CARE/DAY 25 MINUTES: CPT | Performed by: INTERNAL MEDICINE

## 2018-11-10 RX ADMIN — PANTOPRAZOLE SODIUM 40 MG: 40 TABLET, DELAYED RELEASE ORAL at 06:33

## 2018-11-10 RX ADMIN — Medication 3 ML: at 00:06

## 2018-11-10 RX ADMIN — Medication 3 ML: at 09:00

## 2018-11-10 RX ADMIN — Medication 3 ML: at 20:28

## 2018-11-10 RX ADMIN — SACUBITRIL AND VALSARTAN 1 TABLET: 24; 26 TABLET, FILM COATED ORAL at 00:06

## 2018-11-10 RX ADMIN — CEFTRIAXONE SODIUM 1 G: 1 INJECTION, SOLUTION INTRAVENOUS at 13:26

## 2018-11-10 RX ADMIN — ASPIRIN 81 MG: 81 TABLET, COATED ORAL at 09:00

## 2018-11-10 RX ADMIN — FUROSEMIDE 40 MG: 40 TABLET ORAL at 09:00

## 2018-11-10 RX ADMIN — Medication 2 TABLET: at 00:06

## 2018-11-10 RX ADMIN — CITALOPRAM HYDROBROMIDE 20 MG: 20 TABLET ORAL at 09:00

## 2018-11-10 RX ADMIN — CARVEDILOL 6.25 MG: 6.25 TABLET, FILM COATED ORAL at 00:06

## 2018-11-10 RX ADMIN — HEPARIN SODIUM 5000 UNITS: 5000 INJECTION, SOLUTION INTRAVENOUS; SUBCUTANEOUS at 09:00

## 2018-11-10 RX ADMIN — FLUDROCORTISONE ACETATE 0.1 MG: 0.1 TABLET ORAL at 09:00

## 2018-11-10 RX ADMIN — HEPARIN SODIUM 5000 UNITS: 5000 INJECTION, SOLUTION INTRAVENOUS; SUBCUTANEOUS at 20:27

## 2018-11-10 RX ADMIN — CARVEDILOL 6.25 MG: 6.25 TABLET, FILM COATED ORAL at 09:00

## 2018-11-10 RX ADMIN — ATORVASTATIN CALCIUM 40 MG: 40 TABLET, FILM COATED ORAL at 09:00

## 2018-11-10 RX ADMIN — SACUBITRIL AND VALSARTAN 1 TABLET: 24; 26 TABLET, FILM COATED ORAL at 09:00

## 2018-11-10 NOTE — PROGRESS NOTES
Clinical Nutrition   Reason For Visit: Identified at risk by screening criteria, MST score 2+    Patient Name: Edwardo Diggs  YOB: 1940  MRN: [id number]  Date of Encounter: 11/10/18 2:53 PM  Admission date: 11/9/2018      Ordered Ensure daily with lunch per pt request      Nutrition Assessment     Admission Problem List:  UTI  Fall  Orthostatic hypotension  Dementia    Applicable Nutrition-Related Information:  [facility] He  has a past medical history of Anemia, CHF (congestive heart failure) (CMS/HCC), Constipation, Coronary artery disease, Depression, Dermatitis, GERD (gastroesophageal reflux disease), Hyperlipidemia, Orthostatic hypotension, Parkinson's disease (CMS/HCC), Parkinson's disease (CMS/HCC), Stroke (CMS/[facility]), and Tinea corporis.   PSxH: He  has a past surgical history that includes Coronary artery bypass graft and Cataract extraction.        Reported/Observed/Food/Nutrition Related History   Called and spoke with RN at patient's NH. RN reports patient was consuming regular textures/thin liquids, one Ensure daily, no food allergies, has been eating 75-90% at most meals, and has had fluctuations in his recent hx likely due to fluid status. Notes a range of 170-200 lbs lately at the NH.  Spoke with patient who prefers vanilla Ensure daily with lunch meal. No additional preferences/requests at this time.      Anthropometrics   Height: 710 in  Weight: 175 lbs (stated wt 11/9 per [facility] doc)  BMI: 25.1  BMI classification: Overweight: 25.0-29.9kg/m2    UBW: N/A  Weight hx (per RN from pt's NH):  174 lbs (11/9)  190 lbs (10/14)  180 lbs (10/16)  185 lbs (10/29)    Weight change: RD unable to determine if patient has had any weight loss from muscle fat (and not fluid) due to patient's fluid status.      Labs reviewed   Labs reviewed: Yes; note elevated BNP on admission    Medications reviewed   Medications reviewed: Yes  Pertinent: lasix    Current Nutrition Prescription    PO: Diet Regular; Thin; Cardiac    Evaluation of Received Nutrient/Fluid Intake:  Insufficient data       Nutrition Diagnosis     No nutrition dx at this time.    Intervention   Intervention: Follow treatment progress, Care plan reviewed, Interview for preferences, Encourage intake     Ordered vanilla Ensure HP daily with lunch (per pt request)      Goal:   General: Nutrition support treatment  PO: Establish PO      Monitoring/Evaluation:       Monitoring/Evaluation: Per protocol, PO intake, Supplement intake, Weight  Will Continue to follow per protocol  Bria Young RD  Time Spent: 30 min

## 2018-11-10 NOTE — PLAN OF CARE
Problem: Patient Care Overview  Goal: Plan of Care Review  Outcome: Ongoing (interventions implemented as appropriate)   11/10/18 1612   Coping/Psychosocial   Plan of Care Reviewed With patient   Plan of Care Review   Progress no change   OTHER   Outcome Summary VSS, incontinent of urine, fall risk. 2L O2.     Goal: Individualization and Mutuality  Outcome: Ongoing (interventions implemented as appropriate)    Goal: Discharge Needs Assessment  Outcome: Ongoing (interventions implemented as appropriate)    Goal: Interprofessional Rounds/Family Conf  Outcome: Ongoing (interventions implemented as appropriate)      Problem: Fall Risk (Adult)  Goal: Identify Related Risk Factors and Signs and Symptoms  Outcome: Ongoing (interventions implemented as appropriate)    Goal: Absence of Fall  Outcome: Ongoing (interventions implemented as appropriate)      Problem: Urinary Tract Infection (Adult)  Goal: Signs and Symptoms of Listed Potential Problems Will be Absent, Minimized or Managed (Urinary Tract Infection)  Outcome: Ongoing (interventions implemented as appropriate)      Problem: Wound (Includes Pressure Injury) (Adult)  Goal: Signs and Symptoms of Listed Potential Problems Will be Absent, Minimized or Managed (Wound)  Outcome: Ongoing (interventions implemented as appropriate)

## 2018-11-10 NOTE — PROGRESS NOTES
[facility] Medicine Services  PROGRESS NOTE    Patient Name: Edwardo Diggs  : 1940  MRN: [id number]    Date of Admission: 2018  Length of Stay: 1  Primary Care Physician: Provider, No Known    Subjective   Subjective     CC:  F/u UTI    HPI:  Patient resting in bed. He has no complaints. No fever/chills. No chest pain. No acute events overnight.    Review of Systems  Gen- No fevers, chills  CV- No chest pain, palpitations  Resp- No cough, dyspnea  GI- No N/V/D, abd pain    Otherwise ROS is negative except as mentioned in the HPI.    Objective   Objective     Vital Signs:   Temp:  [97.5 °F (36.4 °C)-100.5 °F (38.1 °C)] 98.1 °F (36.7 °C)  Heart Rate:  [72-83] 77  Resp:  [14-20] 18  BP: (101-147)/(62-93) 144/93        Physical Exam:  Constitutional: No acute distress, awake, alert  HENT: NCAT, mucous membranes moist  Respiratory: Clear to auscultation bilaterally, respiratory effort normal   Cardiovascular: RRR, no murmurs, rubs, or gallops, palpable pedal pulses bilaterally  Gastrointestinal: Positive bowel sounds, soft, nontender, nondistended  Musculoskeletal: No bilateral ankle edema  Psychiatric: Appropriate affect, cooperative  Neurologic: pleasantly confused  Skin: No rashes      Results Reviewed:  I have personally reviewed current lab, radiology, and data and agree.    Results from last 7 days   Lab Units  11/10/18   0539  18   1257   WBC 10*3/mm3  8.48  10.57   HEMOGLOBIN g/dL  10.9*  11.4*   HEMATOCRIT %  35.8*  37.3*   PLATELETS 10*3/mm3  168  158     Results from last 7 days   Lab Units  11/10/18   0539  18   1257   SODIUM mmol/L  145  140   POTASSIUM mmol/L  3.6  3.5   CHLORIDE mmol/L  107  103   CO2 mmol/L  30.0  34.0*   BUN mg/dL  20  20   CREATININE mg/dL  0.77  1.01   GLUCOSE mg/dL  84  87   CALCIUM mg/dL  8.4*  8.7   TROPONIN I ng/mL   --   0.024     Estimated Creatinine Clearance: 82.7 mL/min (by C-G formula based on SCr of 0.77 mg/dL).  BNP   Date  Value Ref Range Status   11/09/2018 2,726.0 (H) 0.0 - 100.0 pg/mL Final     Comment:     Results may be falsely decreased if patient taking Biotin.       Microbiology Results Abnormal     Procedure Component Value - Date/Time    Urine Culture - Urine, Urine, Clean Catch [[id number]]  (Abnormal) Collected:  11/09/18 1406    Lab Status:  Preliminary result Specimen:  Urine, Clean Catch Updated:  11/10/18 0730     Urine Culture >100,000 CFU/mL Gram Negative Bacilli          Imaging Results (last 24 hours)     Procedure Component Value Units Date/Time    CT Head Without Contrast [[id number]] Collected:  11/09/18 1344     Updated:  11/09/18 1849    Narrative:       EXAMINATION: CT HEAD WO CONTRAST - 11/9/2018     INDICATION: Previous fall day ago, facial injuries.      TECHNIQUE: Axial CT of the head without intravenous contrast  administration.     The radiation dose reduction device was turned on for each scan per the  ALARA (As Low as Reasonably Achievable) protocol.     COMPARISON: None.     FINDINGS: Midline structures are without midline shift or mass effect.  No hydrocephalus. Large area of encephalomalacia from prior insult right  frontotemporal region and left anterior frontal regions without acute  intracranial hemorrhage or extra-axial fluid collection. Basal cisterns  are patent. Globes and orbits unremarkable without intraconal  abnormality or disruption of the globes. Visualized paranasal sinuses  and mastoid air cells demonstrate minimal mucosal circumferential  thickening of the right maxillary sinus; otherwise grossly clear and  well-pneumatized. Calvarium intact without depressed calvarial fracture.       Impression:       No acute intracranial abnormality or evidence for depressed  calvarial fracture with calvarium intact. Noted bifrontal and right  frontotemporal areas of encephalomalacia from prior insult without acute  intra-axial hemorrhage or extra-axial fluid collection.     DICTATED:    11/9/2018  EDITED/ls :   11/9/2018        CT Abdomen Pelvis Without Contrast [[id number]] Collected:  11/09/18 1345     Updated:  11/09/18 1847    Narrative:       EXAMINATION: CT ABDOMEN PELVIS WO CONTRAST - 11/9/2018     INDICATION: Abdominal tenderness, dementia.      TECHNIQUE: CT abdomen and pelvis without intravenous contrast  administration.     The radiation dose reduction device was turned on for each scan per the  ALARA (As Low as Reasonably Achievable) protocol.     COMPARISON: None.     FINDINGS: Lung bases demonstrate small right and trace left pleural  effusions with adjacent subsegmental atelectasis. Liver without focal  lesion. Gallbladder demonstrates contracted appearance with several  calcific densities in the dependent portion indicating cholelithiasis.  No gross intrahepatic biliary or extrahepatic biliary dilatation.  Pancreas and spleen grossly unremarkable. Adrenals have questionable  thickening bilaterally however no distinct nodule. Kidneys without  hydronephrosis or hydroureter demonstrating a 2 mm nonobstructing left  superior pole renal calculus. No obstructive uropathy or urolithiasis is  otherwise evident. Atherosclerotic nonaneurysmal abdominal aorta. No  bulky retroperitoneal adenopathy. GI tract evaluation demonstrates small  hiatal hernia however no disproportionate dilatation of bowel to suggest  mechanical obstructive process. Sigmoid diverticulosis without evidence  for acute diverticulitis. No loculated intra-abdominal fluid collection  with scattered mesenteric edema however no overt ascites. Pelvic viscera  demonstrate mild to moderate distention of the urinary bladder with  circumferential thickening of the bladder wall and surrounding stranding  concerning for cystitis. No loculated pelvic collection or bulky pelvic  adenopathy otherwise. Degenerative changes of the spine without  aggressive osseous or soft tissue body wall lesions.       Impression:       1. Mild to  moderate distention of the urinary bladder with  circumferential bladder wall thickening and surrounding inflammatory  stranding concerning for cystitis without bladder calculi identified or  obstructive uropathy otherwise noted. No hydronephrosis.  2. Sigmoid diverticulosis without evidence for acute diverticulitis.  3. Small right and trace left pleural effusions with adjacent  atelectasis.     DICTATED:   11/9/2018  EDITED/ls :   11/9/2018            Results for orders placed during the hospital encounter of 10/12/18   Adult Transthoracic Echo Complete W/ Cont if Necessary Per Protocol    Narrative · Estimated EF = 25-30%. Global hypokinesis  · Mild mitral valve regurgitation is present  · Mild tricuspid valve regurgitation is present.  · Calculated right ventricular systolic pressure from tricuspid   regurgitation is 37 mmHg.  · Mildly reduced right ventricular systolic function noted.          I have reviewed the medications.      aspirin 81 mg Oral Daily   atorvastatin 40 mg Oral Daily   carvedilol 6.25 mg Oral Q12H   ceftriaxone 1 g Intravenous Q24H   citalopram 20 mg Oral Daily   fludrocortisone 0.1 mg Oral Daily   furosemide 40 mg Oral Daily   heparin (porcine) 5,000 Units Subcutaneous Q12H   pantoprazole 40 mg Oral Daily   sacubitril-valsartan 1 tablet Oral Q12H   sennosides-docusate sodium 2 tablet Oral Nightly   sodium chloride 3 mL Intravenous Q12H         Assessment/Plan   Assessment / Plan     Active Hospital Problems    Diagnosis   • UTI (urinary tract infection)   • Orthostatic hypotension   • Fall   • Chronic systolic CHF (congestive heart failure) (CMS/HCC)   • Dyslipidemia   • CAD (coronary artery disease)     1. H/O [place] CABG x 5     • Anemia          Brief Hospital Course to date:  Edwardo Diggs is a 78 y.o. male Edwardo Diggs is a 78 y.o. male with PMHx of dementia, HTN, hyperlipidemia, CAD (s/p CABG x 5), chronic systolic heart failure, orthostatic hypotension and COPD on 2L NC. Brought to  [facility] ED from [facility] for ~2-3 days of generalized weakness with mechanical fall. In ED found to have UTI.    Assessment & Plan:  UTI  --cont IV rocephin, cultures growing GNRs     Chronic orthostatic Hypotension  --normotensive since admission  --cont Florinef   --check daily orthostatics     Mechanical Fall  --CT head today without acute changes but noted old right and left frontal regions of encephalomalacia from previous insult  --PT/OT  --fall precautions     Chronic SHF  --ECHO from 10/18 with EF 25%  --BNP elevated to 2700 --> chronically elevated but increased from his baseline however no CHF exac sx's  --cont Entresto/ Lasix/ Coreg as tolerated      HLD  --cont statin      CAD  --ASA, statin     DVT prophylaxis: Heparin    CODE STATUS:   Code Status and Medical Interventions:   Ordered at: 11/09/18 1906     Level Of Support Discussed With:    Patient    Health Care Surrogate     Code Status:    No CPR     Medical Interventions (Level of Support Prior to Arrest):    Comfort Measures     Comments:    Living Will       Disposition: I expect the patient to be discharged 1-2 days      Electronically signed by Johnna Barth DO, 11/10/18, 10:23 AM.

## 2018-11-11 LAB — BACTERIA SPEC AEROBE CULT: ABNORMAL

## 2018-11-11 PROCEDURE — 96372 THER/PROPH/DIAG INJ SC/IM: CPT

## 2018-11-11 PROCEDURE — 25010000002 HEPARIN (PORCINE) PER 1000 UNITS: Performed by: NURSE PRACTITIONER

## 2018-11-11 PROCEDURE — 25010000002 CEFTRIAXONE PER 250 MG: Performed by: NURSE PRACTITIONER

## 2018-11-11 PROCEDURE — 99225 PR SBSQ OBSERVATION CARE/DAY 25 MINUTES: CPT | Performed by: INTERNAL MEDICINE

## 2018-11-11 PROCEDURE — G0378 HOSPITAL OBSERVATION PER HR: HCPCS

## 2018-11-11 RX ADMIN — FLUDROCORTISONE ACETATE 0.1 MG: 0.1 TABLET ORAL at 09:18

## 2018-11-11 RX ADMIN — HEPARIN SODIUM 5000 UNITS: 5000 INJECTION, SOLUTION INTRAVENOUS; SUBCUTANEOUS at 20:52

## 2018-11-11 RX ADMIN — FUROSEMIDE 40 MG: 40 TABLET ORAL at 09:17

## 2018-11-11 RX ADMIN — ATORVASTATIN CALCIUM 40 MG: 40 TABLET, FILM COATED ORAL at 09:18

## 2018-11-11 RX ADMIN — Medication 3 ML: at 09:20

## 2018-11-11 RX ADMIN — SACUBITRIL AND VALSARTAN 1 TABLET: 24; 26 TABLET, FILM COATED ORAL at 20:52

## 2018-11-11 RX ADMIN — CARVEDILOL 6.25 MG: 6.25 TABLET, FILM COATED ORAL at 09:17

## 2018-11-11 RX ADMIN — Medication 3 ML: at 20:52

## 2018-11-11 RX ADMIN — ASPIRIN 81 MG: 81 TABLET, COATED ORAL at 09:18

## 2018-11-11 RX ADMIN — PANTOPRAZOLE SODIUM 40 MG: 40 TABLET, DELAYED RELEASE ORAL at 05:39

## 2018-11-11 RX ADMIN — Medication 2 TABLET: at 20:51

## 2018-11-11 RX ADMIN — SACUBITRIL AND VALSARTAN 1 TABLET: 24; 26 TABLET, FILM COATED ORAL at 09:18

## 2018-11-11 RX ADMIN — HEPARIN SODIUM 5000 UNITS: 5000 INJECTION, SOLUTION INTRAVENOUS; SUBCUTANEOUS at 09:18

## 2018-11-11 RX ADMIN — CITALOPRAM HYDROBROMIDE 20 MG: 20 TABLET ORAL at 09:17

## 2018-11-11 RX ADMIN — CARVEDILOL 6.25 MG: 6.25 TABLET, FILM COATED ORAL at 20:52

## 2018-11-11 RX ADMIN — CEFTRIAXONE SODIUM 1 G: 1 INJECTION, SOLUTION INTRAVENOUS at 12:05

## 2018-11-11 NOTE — PLAN OF CARE
Problem: Patient Care Overview  Goal: Plan of Care Review  Outcome: Ongoing (interventions implemented as appropriate)   11/11/18 0636   Coping/Psychosocial   Plan of Care Reviewed With patient   Plan of Care Review   Progress improving   OTHER   Outcome Summary PT overall rested well. VSS. Refused IV change at beginning of shift but allowed new IV to be placed at end of shift. PT pleasant this shift. Continue IV ABX.      Goal: Individualization and Mutuality  Outcome: Ongoing (interventions implemented as appropriate)    Goal: Discharge Needs Assessment  Outcome: Ongoing (interventions implemented as appropriate)    Goal: Interprofessional Rounds/Family Conf  Outcome: Ongoing (interventions implemented as appropriate)      Problem: Fall Risk (Adult)  Goal: Identify Related Risk Factors and Signs and Symptoms  Outcome: Ongoing (interventions implemented as appropriate)    Goal: Absence of Fall  Outcome: Ongoing (interventions implemented as appropriate)      Problem: Urinary Tract Infection (Adult)  Goal: Signs and Symptoms of Listed Potential Problems Will be Absent, Minimized or Managed (Urinary Tract Infection)  Outcome: Ongoing (interventions implemented as appropriate)      Problem: Wound (Includes Pressure Injury) (Adult)  Goal: Signs and Symptoms of Listed Potential Problems Will be Absent, Minimized or Managed (Wound)  Outcome: Ongoing (interventions implemented as appropriate)

## 2018-11-11 NOTE — PROGRESS NOTES
[facility] Medicine Services  PROGRESS NOTE    Patient Name: Edwardo Diggs  : 1940  MRN: [id number]    Date of Admission: 2018  Length of Stay: 2  Primary Care Physician: Provider, No Known    Subjective   Subjective     CC:  F/u UTI    HPI:  Patient resting in bed. Feels better. No complaints. No family at bedside.     Review of Systems  Gen- No fevers, chills  CV- No chest pain, palpitations  Resp- No cough, dyspnea  GI- No N/V/D, abd pain    Otherwise ROS is negative except as mentioned in the HPI.    Objective   Objective     Vital Signs:   Temp:  [96.8 °F (36 °C)-97.5 °F (36.4 °C)] 97.4 °F (36.3 °C)  Heart Rate:  [65-78] 78  Resp:  [16-18] 18  BP: ()/(47-74) 122/70        Physical Exam:  Constitutional: No acute distress, awake, alert  HENT: large bruises present around b/l eyes- s/p mech fall, mucous membranes moist  Respiratory: Clear to auscultation bilaterally, respiratory effort normal   Cardiovascular: RRR, no murmurs, rubs, or gallops, palpable pedal pulses bilaterally  Gastrointestinal: Positive bowel sounds, soft, nontender, nondistended  Musculoskeletal: No bilateral ankle edema  Psychiatric: Appropriate affect, cooperative  Neurologic: pleasantly confused  Skin: No rashes      Results Reviewed:  I have personally reviewed current lab, radiology, and data and agree.    Results from last 7 days   Lab Units  11/10/18   0539  18   1257   WBC 10*3/mm3  8.48  10.57   HEMOGLOBIN g/dL  10.9*  11.4*   HEMATOCRIT %  35.8*  37.3*   PLATELETS 10*3/mm3  168  158     Results from last 7 days   Lab Units  11/10/18   0539  18   1257   SODIUM mmol/L  145  140   POTASSIUM mmol/L  3.6  3.5   CHLORIDE mmol/L  107  103   CO2 mmol/L  30.0  34.0*   BUN mg/dL  20  20   CREATININE mg/dL  0.77  1.01   GLUCOSE mg/dL  84  87   CALCIUM mg/dL  8.4*  8.7   TROPONIN I ng/mL   --   0.024     Estimated Creatinine Clearance: 82.7 mL/min (by C-G formula based on SCr of 0.77  mg/dL).  BNP   Date Value Ref Range Status   11/09/2018 2,726.0 (H) 0.0 - 100.0 pg/mL Final     Comment:     Results may be falsely decreased if patient taking Biotin.       Microbiology Results Abnormal     Procedure Component Value - Date/Time    Urine Culture - Urine, Urine, Clean Catch [[id number]]  (Abnormal)  (Susceptibility) Collected:  11/09/18 1406    Lab Status:  Final result Specimen:  Urine, Clean Catch Updated:  11/11/18 1055     Urine Culture >100,000 CFU/mL Escherichia coli    Susceptibility      Escherichia coli     LILY     Ampicillin Resistant     Ampicillin + Sulbactam Intermediate     Aztreonam Susceptible     Cefepime Susceptible     Cefotaxime Susceptible     Ceftriaxone Susceptible     Cefuroxime sodium Intermediate     Cephalothin Resistant     Ciprofloxacin Resistant     Ertapenem Susceptible     Gentamicin Susceptible     Levofloxacin Resistant     Meropenem Susceptible     Nitrofurantoin Resistant     Piperacillin + Tazobactam Susceptible     Tetracycline Resistant     Tobramycin Resistant     Trimethoprim + Sulfamethoxazole Susceptible                          Imaging Results (last 24 hours)     ** No results found for the last 24 hours. **        Results for orders placed during the hospital encounter of 10/12/18   Adult Transthoracic Echo Complete W/ Cont if Necessary Per Protocol    Narrative · Estimated EF = 25-30%. Global hypokinesis  · Mild mitral valve regurgitation is present  · Mild tricuspid valve regurgitation is present.  · Calculated right ventricular systolic pressure from tricuspid   regurgitation is 37 mmHg.  · Mildly reduced right ventricular systolic function noted.          I have reviewed the medications.      aspirin 81 mg Oral Daily   atorvastatin 40 mg Oral Daily   carvedilol 6.25 mg Oral Q12H   ceftriaxone 1 g Intravenous Q24H   citalopram 20 mg Oral Daily   fludrocortisone 0.1 mg Oral Daily   furosemide 40 mg Oral Daily   heparin (porcine) 5,000 Units Subcutaneous  Q12H   pantoprazole 40 mg Oral Daily   sacubitril-valsartan 1 tablet Oral Q12H   sennosides-docusate sodium 2 tablet Oral Nightly   sodium chloride 500 mL Intravenous Once   sodium chloride 3 mL Intravenous Q12H         Assessment/Plan   Assessment / Plan     Active Hospital Problems    Diagnosis   • UTI (urinary tract infection)   • Orthostatic hypotension   • Fall   • Chronic systolic CHF (congestive heart failure) (CMS/HCC)   • Dyslipidemia   • CAD (coronary artery disease)     1. H/O [place] CABG x 5     • Anemia          Brief Hospital Course to date:  Edwardo Diggs is a 78 y.o. male Edwardo Diggs is a 78 y.o. male with PMHx of dementia, HTN, hyperlipidemia, CAD (s/p CABG x 5), chronic systolic heart failure, orthostatic hypotension and COPD on 2L NC. Br[facility] ED [facility] for ~2-3 days of generalized weakness with mechanical fall. In ED found to have UTI.    Assessment & Plan:  UTI  --cont IV rocephin, cultures growing GNRs- prelim Ecoli, will follow     Chronic orthostatic Hypotension  --normotensive since admission  --cont Florinef   --check daily orthostatics     Mechanical Fall  --CT head today without acute changes but noted old right and left frontal regions of encephalomalacia from previous insult  --PT/OT  --fall precautions     Chronic SHF  --ECHO from 10/18 with EF 25%  --BNP elevated to 2700 --> chronically elevated but increased from his baseline however no CHF exac sx's  --cont Entresto/ Lasix/ Coreg as tolerated      HLD  --cont statin      CAD  --ASA, statin     DVT prophylaxis: Heparin    CODE STATUS:   Code Status and Medical Interventions:   Ordered at: 11/09/18 1906     Level Of Support Discussed With:    Patient    Health Care Surrogate     Code Status:    No CPR     Medical Interventions (Level of Support Prior to Arrest):    Comfort Measures     Comments:    Living Will       Disposition: I expect the patient to be discharged 1-2 days      Electronically signed by Chelsey  ARIEL Parmar MD, 11/11/18, 12:39 PM.

## 2018-11-12 PROBLEM — N39.0 ACUTE UTI: Status: ACTIVE | Noted: 2018-11-12

## 2018-11-12 PROCEDURE — 97162 PT EVAL MOD COMPLEX 30 MIN: CPT

## 2018-11-12 PROCEDURE — 25010000002 HEPARIN (PORCINE) PER 1000 UNITS: Performed by: NURSE PRACTITIONER

## 2018-11-12 PROCEDURE — 96372 THER/PROPH/DIAG INJ SC/IM: CPT

## 2018-11-12 PROCEDURE — 25010000002 CEFTRIAXONE PER 250 MG: Performed by: NURSE PRACTITIONER

## 2018-11-12 PROCEDURE — G0378 HOSPITAL OBSERVATION PER HR: HCPCS

## 2018-11-12 PROCEDURE — 99225 PR SBSQ OBSERVATION CARE/DAY 25 MINUTES: CPT | Performed by: INTERNAL MEDICINE

## 2018-11-12 RX ADMIN — CEFTRIAXONE SODIUM 1 G: 1 INJECTION, SOLUTION INTRAVENOUS at 13:01

## 2018-11-12 RX ADMIN — SACUBITRIL AND VALSARTAN 1 TABLET: 24; 26 TABLET, FILM COATED ORAL at 09:33

## 2018-11-12 RX ADMIN — HEPARIN SODIUM 5000 UNITS: 5000 INJECTION, SOLUTION INTRAVENOUS; SUBCUTANEOUS at 09:33

## 2018-11-12 RX ADMIN — ASPIRIN 81 MG: 81 TABLET, COATED ORAL at 09:33

## 2018-11-12 RX ADMIN — FUROSEMIDE 40 MG: 40 TABLET ORAL at 09:33

## 2018-11-12 RX ADMIN — MICONAZOLE NITRATE: 2 CREAM TOPICAL at 20:14

## 2018-11-12 RX ADMIN — CARVEDILOL 6.25 MG: 6.25 TABLET, FILM COATED ORAL at 20:14

## 2018-11-12 RX ADMIN — MICONAZOLE NITRATE: 2 CREAM TOPICAL at 12:30

## 2018-11-12 RX ADMIN — FLUDROCORTISONE ACETATE 0.1 MG: 0.1 TABLET ORAL at 09:33

## 2018-11-12 RX ADMIN — HEPARIN SODIUM 5000 UNITS: 5000 INJECTION, SOLUTION INTRAVENOUS; SUBCUTANEOUS at 20:14

## 2018-11-12 RX ADMIN — Medication 3 ML: at 20:17

## 2018-11-12 RX ADMIN — CARVEDILOL 6.25 MG: 6.25 TABLET, FILM COATED ORAL at 09:34

## 2018-11-12 RX ADMIN — CITALOPRAM HYDROBROMIDE 20 MG: 20 TABLET ORAL at 09:34

## 2018-11-12 RX ADMIN — Medication 3 ML: at 09:34

## 2018-11-12 RX ADMIN — PANTOPRAZOLE SODIUM 40 MG: 40 TABLET, DELAYED RELEASE ORAL at 04:53

## 2018-11-12 RX ADMIN — ATORVASTATIN CALCIUM 40 MG: 40 TABLET, FILM COATED ORAL at 09:33

## 2018-11-12 RX ADMIN — SACUBITRIL AND VALSARTAN 1 TABLET: 24; 26 TABLET, FILM COATED ORAL at 20:14

## 2018-11-12 NOTE — PROGRESS NOTES
Discharge Planning Assessment  [facility]     Patient Name: Edwardo Diggs  MRN: [id number]  Today's Date: 11/12/2018    Admit Date: 11/9/2018    Discharge Needs Assessment     Row Name 11/12/18 6910       Living Environment    Lives With  facility resident    Current Living Arrangements  residential facility    Primary Care Provided by  other (see comments) Facility resident    Provides Primary Care For  no one, unable/limited ability to care for self    Family Caregiver if Needed  other (see comments) Facility    Family Caregiver Names  Long term care resident at [facility]      Akbar Diggs  [phone]    Quality of Family Relationships  helpful;involved;supportive    Able to Return to Prior Arrangements  yes       Resource/Environmental Concerns    Resource/Environmental Concerns  none       Transition Planning    Patient/Family Anticipates Transition to  long term care facility    Patient/Family Anticipated Services at Transition      Transportation Anticipated  health plan transportation       Discharge Needs Assessment    Readmission Within the Last 30 Days  no previous admission in last 30 days    Concerns to be Addressed  discharge planning    Equipment Currently Used at Home  walker, standard;wheelchair;oxygen    Anticipated Changes Related to Illness  none    Equipment Needed After Discharge  none    Outpatient/Agency/Support Group Needs  inpatient rehabilitation facility    Discharge Facility/Level of Care Needs  nursing facility, intermediate    Offered/Gave Vendor List  no        Discharge Plan     Row Name 11/12/18 1544       Plan    Plan  Long term care bed at [facility] ([facility])    Patient/Family in Agreement with Plan  yes    Plan Comments  Spoke with patient at bedside.  Patient lives at [facility].  He is a long term care facility resident there in the [place] [facility].  Patient states that he uses a walker and  wheelchair at facility as well as oxygen at night.  Confirmed with patient that his PCP is the facility [facility] and that his insurnace is [facility].  Patient plans to return to his long term care bed when discharged.  Message left with Kellen (admissions coordinator at [facility]) to determine bed hold.  Patient states that his son will be able to transport him back to facility at time of transfer.  Case management will continue to follow for discharge planning needs.    Final Discharge Disposition Code  04 - intermediate care facility        Destination - Selection Complete      Service Provider Request Status Selected Services Address Phone Number Fax Number    [facility] [address] [place] [address] [phone]      Durable Medical Equipment      No service coordination in this encounter.      Dialysis/Infusion      No service coordination in this encounter.      Home Medical Care      No service coordination in this encounter.      Community Resources      No service coordination in this encounter.          Demographic Summary     Row Name 11/12/18 0958       General Information    Admission Type  observation    Arrived From  long term care    Referral Source  admission list    Reason for Consult  discharge planning    Preferred Language  English     Used During This Interaction  no    General Information Comments  Facility provider        Functional Status     Row Name 11/12/18 1533       Functional Status    Usual Activity Tolerance  moderate    Current Activity Tolerance  moderate       Functional Status, IADL    Medications  assistive person    Meal Preparation  assistive person    Housekeeping  assistive person    Laundry  assistive person    Shopping  assistive person        Psychosocial    No documentation.       Abuse/Neglect    No documentation.       Legal    No documentation.       Substance Abuse    No documentation.        Patient Forms    No documentation.           Dodie Nelson RN

## 2018-11-12 NOTE — PROGRESS NOTES
[facility] Medicine Services  PROGRESS NOTE    Patient Name: Edwardo Diggs  : 1940  MRN: [id number]    Date of Admission: 2018  Length of Stay: 1  Primary Care Physician: Provider, No Known    Subjective   Subjective     CC:  F/u UTI    HPI:  No overnight events. Patient very grumpy this morning states he does not want to work with PT/OT because they cause him pain. Feels well overall. States he is not ready to leave the hospital but does not give specific reasons.    Review of Systems  Gen- No fevers, chills  CV- No chest pain, palpitations  Resp- No cough, dyspnea  GI- No N/V/D, abd pain    Otherwise ROS is negative except as mentioned in the HPI.    Objective   Objective     Vital Signs:   Temp:  [97.1 °F (36.2 °C)] 97.1 °F (36.2 °C)  Heart Rate:  [65-73] 73  Resp:  [16-18] 18  BP: (110-130)/(63-86) 116/75        Physical Exam:  Constitutional: No acute distress, awake, alert  HENT: large bruises present around b/l eyes- s/p mech fall, mucous membranes moist  Respiratory: Clear to auscultation bilaterally, respiratory effort normal   Cardiovascular: RRR, no murmurs, rubs, or gallops, palpable pedal pulses bilaterally  Gastrointestinal: Positive bowel sounds, soft, nontender, nondistended  Musculoskeletal: No bilateral ankle edema  Psychiatric: Appropriate affect, cooperative  Neurologic: pleasantly confused  Skin: No rashes      Results Reviewed:  I have personally reviewed current lab, radiology, and data and agree.    Results from last 7 days   Lab Units  11/10/18   0539  18   1257   WBC 10*3/mm3  8.48  10.57   HEMOGLOBIN g/dL  10.9*  11.4*   HEMATOCRIT %  35.8*  37.3*   PLATELETS 10*3/mm3  168  158     Results from last 7 days   Lab Units  11/10/18   0539  18   1257   SODIUM mmol/L  145  140   POTASSIUM mmol/L  3.6  3.5   CHLORIDE mmol/L  107  103   CO2 mmol/L  30.0  34.0*   BUN mg/dL  20  20   CREATININE mg/dL  0.77  1.01   GLUCOSE mg/dL  84  87   CALCIUM mg/dL   8.4*  8.7   TROPONIN I ng/mL   --   0.024     Estimated Creatinine Clearance: 81.5 mL/min (by C-G formula based on SCr of 0.77 mg/dL).  BNP   Date Value Ref Range Status   11/09/2018 2,726.0 (H) 0.0 - 100.0 pg/mL Final     Comment:     Results may be falsely decreased if patient taking Biotin.       Microbiology Results Abnormal     Procedure Component Value - Date/Time    Urine Culture - Urine, Urine, Clean Catch [[id number]]  (Abnormal)  (Susceptibility) Collected:  11/09/18 1406    Lab Status:  Final result Specimen:  Urine, Clean Catch Updated:  11/11/18 1055     Urine Culture >100,000 CFU/mL Escherichia coli    Susceptibility      Escherichia coli     LILY     Ampicillin Resistant     Ampicillin + Sulbactam Intermediate     Aztreonam Susceptible     Cefepime Susceptible     Cefotaxime Susceptible     Ceftriaxone Susceptible     Cefuroxime sodium Intermediate     Cephalothin Resistant     Ciprofloxacin Resistant     Ertapenem Susceptible     Gentamicin Susceptible     Levofloxacin Resistant     Meropenem Susceptible     Nitrofurantoin Resistant     Piperacillin + Tazobactam Susceptible     Tetracycline Resistant     Tobramycin Resistant     Trimethoprim + Sulfamethoxazole Susceptible                          Imaging Results (last 24 hours)     Procedure Component Value Units Date/Time    CT Head Without Contrast [[id number]] Collected:  11/09/18 1344     Updated:  11/12/18 0828    Narrative:       EXAMINATION: CT HEAD WO CONTRAST - 11/9/2018     INDICATION: Previous fall day ago, facial injuries.      TECHNIQUE: Axial CT of the head without intravenous contrast  administration.     The radiation dose reduction device was turned on for each scan per the  ALARA (As Low as Reasonably Achievable) protocol.     COMPARISON: None.     FINDINGS: Midline structures are without midline shift or mass effect.  No hydrocephalus. Large area of encephalomalacia from prior insult right  frontotemporal region and left anterior  frontal regions without acute  intracranial hemorrhage or extra-axial fluid collection. Basal cisterns  are patent. Globes and orbits unremarkable without intraconal  abnormality or disruption of the globes. Visualized paranasal sinuses  and mastoid air cells demonstrate minimal mucosal circumferential  thickening of the right maxillary sinus; otherwise grossly clear and  well-pneumatized. Calvarium intact without depressed calvarial fracture.       Impression:       No acute intracranial abnormality or evidence for depressed  calvarial fracture with calvarium intact. Noted bifrontal and right  frontotemporal areas of encephalomalacia from prior insult without acute  intra-axial hemorrhage or extra-axial fluid collection.     DICTATED:   11/9/2018  EDITED/ls :   11/9/2018      This report was finalized on 11/12/2018 8:26 AM by Dr. Iglesia Gomez.       CT Abdomen Pelvis Without Contrast [[id number]] Collected:  11/09/18 1345     Updated:  11/12/18 0828    Narrative:       EXAMINATION: CT ABDOMEN PELVIS WO CONTRAST - 11/9/2018     INDICATION: Abdominal tenderness, dementia.      TECHNIQUE: CT abdomen and pelvis without intravenous contrast  administration.     The radiation dose reduction device was turned on for each scan per the  ALARA (As Low as Reasonably Achievable) protocol.     COMPARISON: None.     FINDINGS: Lung bases demonstrate small right and trace left pleural  effusions with adjacent subsegmental atelectasis. Liver without focal  lesion. Gallbladder demonstrates contracted appearance with several  calcific densities in the dependent portion indicating cholelithiasis.  No gross intrahepatic biliary or extrahepatic biliary dilatation.  Pancreas and spleen grossly unremarkable. Adrenals have questionable  thickening bilaterally however no distinct nodule. Kidneys without  hydronephrosis or hydroureter demonstrating a 2 mm nonobstructing left  superior pole renal calculus. No obstructive uropathy or  urolithiasis is  otherwise evident. Atherosclerotic nonaneurysmal abdominal aorta. No  bulky retroperitoneal adenopathy. GI tract evaluation demonstrates small  hiatal hernia however no disproportionate dilatation of bowel to suggest  mechanical obstructive process. Sigmoid diverticulosis without evidence  for acute diverticulitis. No loculated intra-abdominal fluid collection  with scattered mesenteric edema however no overt ascites. Pelvic viscera  demonstrate mild to moderate distention of the urinary bladder with  circumferential thickening of the bladder wall and surrounding stranding  concerning for cystitis. No loculated pelvic collection or bulky pelvic  adenopathy otherwise. Degenerative changes of the spine without  aggressive osseous or soft tissue body wall lesions.       Impression:       1. Mild to moderate distention of the urinary bladder with  circumferential bladder wall thickening and surrounding inflammatory  stranding concerning for cystitis without bladder calculi identified or  obstructive uropathy otherwise noted. No hydronephrosis.  2. Sigmoid diverticulosis without evidence for acute diverticulitis.  3. Small right and trace left pleural effusions with adjacent  atelectasis.     DICTATED:   11/9/2018  EDITED/ls :   11/9/2018      This report was finalized on 11/12/2018 8:26 AM by Dr. Iglesia Gomez.           Results for orders placed during the hospital encounter of 10/12/18   Adult Transthoracic Echo Complete W/ Cont if Necessary Per Protocol    Narrative · Estimated EF = 25-30%. Global hypokinesis  · Mild mitral valve regurgitation is present  · Mild tricuspid valve regurgitation is present.  · Calculated right ventricular systolic pressure from tricuspid   regurgitation is 37 mmHg.  · Mildly reduced right ventricular systolic function noted.          I have reviewed the medications.      aspirin 81 mg Oral Daily   atorvastatin 40 mg Oral Daily   carvedilol 6.25 mg Oral Q12H   ceftriaxone  1 g Intravenous Q24H   citalopram 20 mg Oral Daily   fludrocortisone 0.1 mg Oral Daily   furosemide 40 mg Oral Daily   heparin (porcine) 5,000 Units Subcutaneous Q12H   miconazole  Topical Q12H   pantoprazole 40 mg Oral Daily   sacubitril-valsartan 1 tablet Oral Q12H   sennosides-docusate sodium 2 tablet Oral Nightly   sodium chloride 500 mL Intravenous Once   sodium chloride 3 mL Intravenous Q12H         Assessment/Plan   Assessment / Plan     Active Hospital Problems    Diagnosis   • Acute UTI   • UTI (urinary tract infection)   • Orthostatic hypotension   • Fall   • Chronic systolic CHF (congestive heart failure) (CMS/HCC)   • Dyslipidemia   • CAD (coronary artery disease)     1. H/O [place] CABG x 5     • Anemia          Brief Hospital Course to date:  Edwardo Diggs is a 78 y.o. male Edwardo Diggs is a 78 y.o. male with PMHx of dementia, HTN, hyperlipidemia, CAD (s/p CABG x 5), chronic systolic heart failure, orthostatic hypotension and COPD on 2L NC. Brought to [facility] ED from [facility] for ~2-3 days of generalized weakness with mechanical fall. In ED found to have UTI.    Assessment & Plan:  UTI  --cont IV rocephin, cultures growing Ecoli, sensitive to rocephin, continue      Chronic orthostatic Hypotension  --normotensive since admission  --cont Florinef   --check daily orthostatics     Mechanical Fall  --CT head today without acute changes but noted old right and left frontal regions of encephalomalacia from previous insult  --PT/OT  --fall precautions     Chronic SHF  --ECHO from 10/18 with EF 25%  --BNP elevated to 2700 --> chronically elevated but increased from his baseline however no CHF exac sx's  --cont Entresto/ Lasix/ Coreg as tolerated     Pressure wounds  - WOC consult      HLD  --cont statin      CAD  --ASA, statin     DVT prophylaxis: Heparin    CODE STATUS:   Code Status and Medical Interventions:   Ordered at: 11/09/18 1906     Level Of Support Discussed With:    Patient    Health Care  Surrogate     Code Status:    No CPR     Medical Interventions (Level of Support Prior to Arrest):    Comfort Measures     Comments:    Living Will       Disposition: I expect the patient to be discharged back to [facility] as soon as tomorrow pending acceptance      Electronically signed by Chelsey Parmar MD, 11/12/18, 12:12 PM.

## 2018-11-12 NOTE — THERAPY EVALUATION
Acute Care - Physical Therapy Initial Evaluation  [facility]     Patient Name: Edwardo Diggs  : 1940  MRN: [id number]  Today's Date: 2018   Onset of Illness/Injury or Date of Surgery: 18  Date of Referral to PT: 18  Referring Physician: MD Chris      Admit Date: 2018    Visit Dx:     ICD-10-CM ICD-9-CM   1. Acute UTI N39.0 599.0   2. Hypotension, unspecified hypotension type I95.9 458.9   3. Generalized weakness R53.1 780.79   4. Impaired functional mobility, balance, gait, and endurance Z74.09 V49.89     Patient Active Problem List   Diagnosis   • Anemia   • Dyslipidemia   • CAD (coronary artery disease)   • History of cardioembolic cerebrovascular accident (CVA)   • Poor historian   • Chronic respiratory failure with hypoxia (CMS/HCC)   • Hx of CABG   • Chronic systolic CHF (congestive heart failure) (CMS/HCC)   • UTI (urinary tract infection)   • Orthostatic hypotension   • Fall   • Acute UTI     Past Medical History:   Diagnosis Date   • Anemia    • CHF (congestive heart failure) (CMS/HCC)    • Constipation    • Coronary artery disease    • Depression    • Dermatitis    • GERD (gastroesophageal reflux disease)    • Hyperlipidemia    • Orthostatic hypotension    • Parkinson's disease (CMS/HCC)    • Parkinson's disease (CMS/HCC)    • Stroke (CMS/HCC)    • Tinea corporis      Past Surgical History:   Procedure Laterality Date   • CATARACT EXTRACTION     • CORONARY ARTERY BYPASS GRAFT          PT ASSESSMENT (last 12 hours)      Physical Therapy Evaluation     Row Name 18 1039          PT Evaluation Time/Intention    Subjective Information  no complaints  -EH     Document Type  evaluation  -EH     Mode of Treatment  individual therapy;physical therapy  -EH     Patient Effort  fair  -EH     Symptoms Noted During/After Treatment  dizziness  -EH     Comment  pt reported need to go to restroom, participates in MMT/ ROM screen after. Pt reporte dizziness with intial sup>sit motion but  "it resolves quickly,  -     Row Name 11/12/18 1039          General Information    Patient Profile Reviewed?  yes  -     Onset of Illness/Injury or Date of Surgery  11/11/18  -     Referring Physician  MD Chris  -     Patient Observations  alert;cooperative;agree to therapy  -     Patient/Family Observations  no family in room.  -     General Observations of Patient  Pt supine in bed asking to get to bathroom.  -     Prior Level of Function  -- \"been a long time since I walked\" but has walked after fall  -     Equipment Currently Used at Home  rollator;oxygen  -     Pertinent History of Current Functional Problem  Pt prsented to ED via EMS from [facility] for hypotension of 73/45. Pt had recent mechanical fall with facial bruising \"a few days ago\". On morning of day of admit pt c/o HA and dizziness. Pt also c/o increased SOB, generalized weakness. UA (+) for UTI.  -     Existing Precautions/Restrictions  fall;other (see comments) hypotension  -[facility] Name 11/12/18 1039          Relationship/Environment    Lives With  facility resident [facility]  -[facility] Name 11/12/18 1039          Cognitive Assessment/Intervention- PT/OT    Orientation Status (Cognition)  oriented to;person;situation  -[facility] Name 11/12/18 1039          Safety Issues, Functional Mobility    Safety Issues Affecting Function (Mobility)  safety precautions follow-through/compliance;safety precaution awareness;insight into deficits/self awareness;judgment;problem solving  -[facility] Name 11/12/18 1039          Bed Mobility Assessment/Treatment    Bed Mobility Assessment/Treatment  supine-sit;sit-supine  -     Supine-Sit [place] (Bed Mobility)  minimum assist (75% patient effort)  [facility]     Sit-Supine [place] (Bed Mobility)  not tested  -[facility] Name 11/12/18 1039          Transfer Assessment/Treatment    Transfer Assessment/Treatment  sit-stand transfer;stand-sit transfer;toilet transfer  -     " Comment (Transfers)  pt requires cueing for hand placement with each transfer and still does not attend to this cueing.   -     Sit-Stand Cross (Transfers)  contact guard  -     Stand-Sit Cross (Transfers)  contact guard  -     Cross Level (Toilet Transfer)  minimum assist (75% patient effort)  -     Assistive Device (Toilet Transfer)  walker, front-wheeled  -[facility] Name 11/12/18 1039          Sit-Stand Transfer    Assistive Device (Sit-Stand Transfers)  walker, front-wheeled  -     Row Name 11/12/18 1039          Stand-Sit Transfer    Assistive Device (Stand-Sit Transfers)  walker, front-wheeled  -[facility] Name 11/12/18 1039          Toilet Transfer    Type (Toilet Transfer)  sit-stand;stand-sit  -[facility] Name 11/12/18 1039          Gait/Stairs Assessment/Training    Gait/Stairs Assessment/Training  gait/ambulation independence;gait/ambulation assistive device  -     Cross Level (Gait)  contact guard  -     Assistive Device (Gait)  walker, front-wheel  -     Distance in Feet (Gait)  17+17 (to bathroom and back  -     Bilateral Gait Deviations  forward flexed posture  -     Comment (Gait/Stairs)  Pt ambulates with forward flexed posture to bathroom, small step through gait. Declines further ambulation.  -[facility] Name 11/12/18 1039          General ROM    GENERAL ROM COMMENTS  BLE WNL  -[facility] 11/12/18 1039          MMT (Manual Muscle Testing)    General MMT Comments  knee extension 5/5, DF 5/5, knee flexion 5/5, hip flexion 4+/5. no flinching of c/o pain. UE elbow flexion/ extension 5/5.   -[facility] Name 11/12/18 1039          Motor Assessment/Intervention    Additional Documentation  Therapeutic Exercise (Group)  -EH     Row Name 11/12/18 1039          Therapeutic Exercise    67506 - PT Therapeutic Activity Minutes  3  -EH     Row Name 11/12/18 1039          Pain Assessment    Additional Documentation  Pain Scale: Word Pre/Post-Treatment  (Group)  -     Row Name 11/12/18 1039          Pain Scale: Word Pre/Post-Treatment    Pain: Word Scale, Pretreatment  0 - no pain  -     Pain: Word Scale, Post-Treatment  0 - no pain  -     Pre/Post Treatment Pain Comment  pt denies pain. Per MD note pt refuses PT normally because it hurts him.  -EH     Row Name             Wound 11/10/18 2000 Left distal;lower;lateral arm skin tear    Wound - Properties Group Date first assessed: 11/10/18  -KO Time first assessed: 2000  -KO Side: Left  -KO Orientation: distal;lower;lateral  -KO Location: arm  -KO Type: skin tear  -KO Additional Comments: Skin tear from fall prior to admit.   -KO    Row Name             Wound 11/10/18 2000 Bilateral  groin MASD (moisture associated skin damage)    Wound - Properties Group Date first assessed: 11/10/18  -KO Time first assessed: 2000  -KO Side: Bilateral  -KO Orientation: --  -KO, Groin and inner thighs  Location: groin  -KO Type: MASD (moisture associated skin damage)  -KO    Row Name             Wound 11/10/18 2000 Left anterior knee abrasion    Wound - Properties Group Date first assessed: 11/10/18  -KO Time first assessed: 2000  -KO Present On Admission : yes  -KO Side: Left  -KO Orientation: anterior  -KO Location: knee  -KO Type: abrasion  -KO    Row Name 11/12/18 1039          Coping    Observed Emotional State  calm;cooperative  -     Verbalized Emotional State  hopefulness  -     Row Name 11/12/18 1039          Plan of Care Review    Plan of Care Reviewed With  patient  -     Row Name 11/12/18 1039          Physical Therapy Clinical Impression    Date of Referral to PT  11/11/18  -     PT Diagnosis (PT Clinical Impression)  decreased functional mobility, decreased independence.  -     Criteria for Skilled Interventions Met (PT Clinical Impression)  yes;treatment indicated  -     Rehab Potential (PT Clinical Summary)  good, to achieve stated therapy goals  -     Row Name 11/12/18 1039          Physical  Therapy Goals    Bed Mobility Goal Selection (PT)  bed mobility, PT goal 1  -EH     Transfer Goal Selection (PT)  transfer, PT goal 1  -EH     Gait Training Goal Selection (PT)  gait training, PT goal 1  -     Row Name 11/12/18 1039          Bed Mobility Goal 1 (PT)    Activity/Assistive Device (Bed Mobility Goal 1, PT)  bed mobility activities, all  -EH     Prole Level/Cues Needed (Bed Mobility Goal 1, PT)  conditional independence  -EH     Time Frame (Bed Mobility Goal 1, PT)  long term goal (LTG);1 week  -     Row Name 11/12/18 1039          Transfer Goal 1 (PT)    Activity/Assistive Device (Transfer Goal 1, PT)  sit-to-stand/stand-to-sit;walker, rolling  -EH     Prole Level/Cues Needed (Transfer Goal 1, PT)  supervision required  -EH     Time Frame (Transfer Goal 1, PT)  long term goal (LTG);1 week  -     Row Name 11/12/18 1039          Gait Training Goal 1 (PT)    Activity/Assistive Device (Gait Training Goal 1, PT)  gait (walking locomotion);walker, rolling  -EH     Prole Level (Gait Training Goal 1, PT)  contact guard assist;supervision required  -EH     Distance (Gait Goal 1, PT)  150  -EH     Time Frame (Gait Training Goal 1, PT)  long term goal (LTG);1 week  -     Row Name 11/12/18 1039          Positioning and Restraints    Pre-Treatment Position  in bed  -EH     Post Treatment Position  chair  -EH     In Chair  notified nsg;reclined;call light within reach;encouraged to call for assist;exit alarm on;waffle cushion  -       User Key  (r) = Recorded By, (t) = Taken By, (c) = Cosigned By    Initials Name Provider Type     Kena Grover, PT Physical Therapist    Carole Goodwin, RN Registered Nurse        Physical Therapy Education     Title: PT OT SLP Therapies (Active)     Topic: Physical Therapy (Active)     Point: Mobility training (Active)     Learning Progress Summary           Patient Acceptance, E, NR by  at 11/12/2018  1:48 PM                   Point: Home  exercise program (Active)     Learning Progress Summary           Patient Acceptance, E, NR by  at 11/12/2018  1:48 PM                   Point: Body mechanics (Active)     Learning Progress Summary           Patient Acceptance, E, NR by  at 11/12/2018  1:48 PM                   Point: Precautions (Active)     Learning Progress Summary           Patient Acceptance, E, NR by  at 11/12/2018  1:48 PM                               User Key     Initials Effective Dates Name Provider Type [facility] 06/19/15 -  Kena Grover PT Physical Therapist PT              PT Recommendation and Plan  Planned Therapy Interventions (PT Eval): balance training, bed mobility training, gait training, home exercise program, ROM (range of motion), stair training, strengthening, transfer training, postural re-education, patient/family education  Therapy Frequency (PT Clinical Impression): daily  Plan of Care Reviewed With: patient  Outcome Summary: Pt ambulates in room with PT, has one instance of dizziness but it resolves quickly and pt is able to perform bed mobility with MIN A, ambulation and t/f from bed with CGA. Toilet t/f required MOD A. Pt is recommended to have therapy upon return to [facility]. PT will continue during stay.     Time Calculation:   PT Charges     Row Name 11/12/18 1039             Time Calculation    Start Time  1039  -      PT Received On  11/12/18  -      PT Goal Re-Cert Due Date  11/22/18  -         Time Calculation- PT    Total Timed Code Minutes- PT  0 minute(s)  -         Timed Charges    49351 - PT Therapeutic Activity Minutes  3  -        User Key  (r) = Recorded By, (t) = Taken By, (c) = Cosigned By    Initials Name Provider Type     Kena Grover PT Physical Therapist        Therapy Suggested Charges     Code   Minutes Charges    65852 (CPT®) Hc Pt Neuromusc Re Education Ea 15 Min      71599 (CPT®) Hc Pt Ther Proc Ea 15 Min      52728 (CPT®) Hc Gait Training Ea 15  Min      89287 (CPT®) Hc Pt Therapeutic Act Ea 15 Min 3     39317 (CPT®) Hc Pt Manual Therapy Ea 15 Min      06639 (CPT®) Hc Pt Iontophoresis Ea 15 Min      41651 (CPT®) Hc Pt Elec Stim Ea-Per 15 Min      08298 (CPT®) Hc Pt Ultrasound Ea 15 Min      96750 (CPT®) Hc Pt Self Care/Mgmt/Train Ea 15 Min      92947 (CPT®) Hc Pt Prosthetic (S) Train Initial Encounter, Each 15 Min      17579 (CPT®) Hc Pt Orthotic(S)/Prosthetic(S) Encounter, Each 15 Min      61445 (CPT®) Hc Orthotic(S) Mgmt/Train Initial Encounter, Each 15min      Total  3         Therapy Charges for Today     Code Description Service Date Service Provider Modifiers Qty    67188047035 HC PT EVAL MOD COMPLEXITY 4 11/12/2018 Kena Grover, PT GP 1          PT G-Codes  AM-PAC 6 Clicks Score: 12      Kena Grover, PT  11/12/2018

## 2018-11-12 NOTE — PLAN OF CARE
Problem: Patient Care Overview  Goal: Plan of Care Review  Outcome: Ongoing (interventions implemented as appropriate)   11/12/18 5687   Coping/Psychosocial   Plan of Care Reviewed With patient   Plan of Care Review   Progress no change   OTHER   Outcome Summary Patient consult for MASD/Yeast to groin-area presents with yeast in groin folds and on bilateral gluteals-Miconazole cream ordered-Z guard applied-waffle mattress in use-patient turns fairly well. Patient has skin tears on all 4 extremities in various stages of healing. See skin care orders and LDA's. WOC will continue to follow.        Problem: Wound (Includes Pressure Injury) (Adult)  Goal: Signs and Symptoms of Listed Potential Problems Will be Absent, Minimized or Managed (Wound)  Outcome: Ongoing (interventions implemented as appropriate)

## 2018-11-12 NOTE — PLAN OF CARE
Problem: Patient Care Overview  Goal: Plan of Care Review  Outcome: Ongoing (interventions implemented as appropriate)   11/12/18 1032   Coping/Psychosocial   Plan of Care Reviewed With patient   OTHER   Outcome Summary Pt ambulates in room with PT, has one instance of dizziness but it resolves quickly and pt is able to perform bed mobility with MIN A, ambulation and t/f from bed with CGA. Toilet t/f required MOD A. Pt is recommended to have therapy upon return to [facility]. PT will continue during stay.

## 2018-11-12 NOTE — PLAN OF CARE
Problem: Patient Care Overview  Goal: Plan of Care Review  Outcome: Ongoing (interventions implemented as appropriate)   11/12/18 0342   Coping/Psychosocial   Plan of Care Reviewed With patient   Plan of Care Review   Progress improving   OTHER   Outcome Summary PT rested well through the night. VSS. No new complaints. PT pleasant and cooperative.      Goal: Individualization and Mutuality  Outcome: Ongoing (interventions implemented as appropriate)    Goal: Discharge Needs Assessment  Outcome: Ongoing (interventions implemented as appropriate)    Goal: Interprofessional Rounds/Family Conf  Outcome: Ongoing (interventions implemented as appropriate)      Problem: Fall Risk (Adult)  Goal: Identify Related Risk Factors and Signs and Symptoms  Outcome: Ongoing (interventions implemented as appropriate)    Goal: Absence of Fall  Outcome: Ongoing (interventions implemented as appropriate)      Problem: Urinary Tract Infection (Adult)  Goal: Signs and Symptoms of Listed Potential Problems Will be Absent, Minimized or Managed (Urinary Tract Infection)  Outcome: Ongoing (interventions implemented as appropriate)      Problem: Wound (Includes Pressure Injury) (Adult)  Goal: Signs and Symptoms of Listed Potential Problems Will be Absent, Minimized or Managed (Wound)  Outcome: Ongoing (interventions implemented as appropriate)

## 2018-11-13 PROCEDURE — 99225 PR SBSQ OBSERVATION CARE/DAY 25 MINUTES: CPT | Performed by: INTERNAL MEDICINE

## 2018-11-13 PROCEDURE — G0378 HOSPITAL OBSERVATION PER HR: HCPCS

## 2018-11-13 PROCEDURE — 97166 OT EVAL MOD COMPLEX 45 MIN: CPT

## 2018-11-13 PROCEDURE — G8988 SELF CARE GOAL STATUS: HCPCS

## 2018-11-13 PROCEDURE — G8987 SELF CARE CURRENT STATUS: HCPCS

## 2018-11-13 PROCEDURE — 25010000002 CEFTRIAXONE PER 250 MG: Performed by: NURSE PRACTITIONER

## 2018-11-13 PROCEDURE — 25010000002 HEPARIN (PORCINE) PER 1000 UNITS: Performed by: NURSE PRACTITIONER

## 2018-11-13 PROCEDURE — 96372 THER/PROPH/DIAG INJ SC/IM: CPT

## 2018-11-13 PROCEDURE — 97535 SELF CARE MNGMENT TRAINING: CPT

## 2018-11-13 RX ADMIN — FUROSEMIDE 40 MG: 40 TABLET ORAL at 09:31

## 2018-11-13 RX ADMIN — CARVEDILOL 6.25 MG: 6.25 TABLET, FILM COATED ORAL at 09:31

## 2018-11-13 RX ADMIN — MICONAZOLE NITRATE: 2 CREAM TOPICAL at 09:30

## 2018-11-13 RX ADMIN — SACUBITRIL AND VALSARTAN 1 TABLET: 24; 26 TABLET, FILM COATED ORAL at 09:30

## 2018-11-13 RX ADMIN — Medication 3 ML: at 09:31

## 2018-11-13 RX ADMIN — FLUDROCORTISONE ACETATE 0.1 MG: 0.1 TABLET ORAL at 09:31

## 2018-11-13 RX ADMIN — MICONAZOLE NITRATE: 2 CREAM TOPICAL at 22:45

## 2018-11-13 RX ADMIN — ASPIRIN 81 MG: 81 TABLET, COATED ORAL at 09:31

## 2018-11-13 RX ADMIN — ATORVASTATIN CALCIUM 40 MG: 40 TABLET, FILM COATED ORAL at 09:31

## 2018-11-13 RX ADMIN — SACUBITRIL AND VALSARTAN 1 TABLET: 24; 26 TABLET, FILM COATED ORAL at 22:01

## 2018-11-13 RX ADMIN — HEPARIN SODIUM 5000 UNITS: 5000 INJECTION, SOLUTION INTRAVENOUS; SUBCUTANEOUS at 22:00

## 2018-11-13 RX ADMIN — CITALOPRAM HYDROBROMIDE 20 MG: 20 TABLET ORAL at 09:31

## 2018-11-13 RX ADMIN — PANTOPRAZOLE SODIUM 40 MG: 40 TABLET, DELAYED RELEASE ORAL at 06:06

## 2018-11-13 RX ADMIN — Medication 3 ML: at 22:46

## 2018-11-13 RX ADMIN — CEFTRIAXONE SODIUM 1 G: 1 INJECTION, SOLUTION INTRAVENOUS at 16:19

## 2018-11-13 RX ADMIN — Medication 2 TABLET: at 22:01

## 2018-11-13 RX ADMIN — CARVEDILOL 6.25 MG: 6.25 TABLET, FILM COATED ORAL at 22:01

## 2018-11-13 NOTE — PLAN OF CARE
Problem: Patient Care Overview  Goal: Plan of Care Review  Outcome: Ongoing (interventions implemented as appropriate)   11/13/18 0422   Coping/Psychosocial   Plan of Care Reviewed With patient   Plan of Care Review   Progress no change   OTHER   Outcome Summary Pt resting in bed. Denies pain. VSS. 2L NC O2. PT workign with pt. Pt ambulated to bed to chair, chair to bed. Denied any dizziness, pain. Plan for d/c back to [facility] if bed available today.       Problem: Fall Risk (Adult)  Goal: Identify Related Risk Factors and Signs and Symptoms  Outcome: Ongoing (interventions implemented as appropriate)   11/13/18 0422   Fall Risk (Adult)   Related Risk Factors (Fall Risk) age-related changes;confusion/agitation;bladder function altered;fatigue/slow reaction;history of falls;gait/mobility problems   Signs and Symptoms (Fall Risk) presence of risk factors     Goal: Absence of Fall  Outcome: Outcome(s) achieved Date Met: 11/13/18 11/13/18 0422   Fall Risk (Adult)   Absence of Fall achieves outcome       Problem: Urinary Tract Infection (Adult)  Goal: Signs and Symptoms of Listed Potential Problems Will be Absent, Minimized or Managed (Urinary Tract Infection)  Outcome: Ongoing (interventions implemented as appropriate)   11/13/18 0422   Goal/Outcome Evaluation   Problems Assessed (Urinary Tract Infection) all   Problems Present (UTI) none       Problem: Wound (Includes Pressure Injury) (Adult)  Goal: Signs and Symptoms of Listed Potential Problems Will be Absent, Minimized or Managed (Wound)  Outcome: Ongoing (interventions implemented as appropriate)   11/13/18 0422   Goal/Outcome Evaluation   Problems Assessed (Wound) all   Problems Present (Wound) situational response

## 2018-11-13 NOTE — PROGRESS NOTES
[facility] Medicine Services  PROGRESS NOTE    Patient Name: Edwardo Diggs  : 1940  MRN: [id number]    Date of Admission: 2018  Length of Stay: 1  Primary Care Physician: Provider, No Known    Subjective   Subjective     CC:  F/u UTI    HPI:  No overnight events. No complaints this morning. Drowsy    Review of Systems      Otherwise ROS is negative except as mentioned in the HPI.    Objective   Objective     Vital Signs:   Temp:  [97.5 °F (36.4 °C)-98.1 °F (36.7 °C)] 97.5 °F (36.4 °C)  Heart Rate:  [65-71] 65  Resp:  [16-18] 16  BP: (111-121)/(68-82) 112/82        Physical Exam:  Constitutional: No acute distress, awake but drowsy  HENT: large bruises present around b/l eyes- s/p mech fall, mucous membranes moist  Respiratory: Clear to auscultation bilaterally, respiratory effort normal   Cardiovascular: RRR, no murmurs, rubs, or gallops, palpable pedal pulses bilaterally  Gastrointestinal: Positive bowel sounds, soft, nontender, nondistended  Musculoskeletal: No bilateral ankle edema  Psychiatric: Appropriate affect, cooperative  Neurologic: pleasantly confused  Skin: No rashes      Results Reviewed:  I have personally reviewed current lab, radiology, and data and agree.    Results from last 7 days   Lab Units  11/10/18   0539  18   1257   WBC 10*3/mm3  8.48  10.57   HEMOGLOBIN g/dL  10.9*  11.4*   HEMATOCRIT %  35.8*  37.3*   PLATELETS 10*3/mm3  168  158     Results from last 7 days   Lab Units  11/10/18   0539  18   1257   SODIUM mmol/L  145  140   POTASSIUM mmol/L  3.6  3.5   CHLORIDE mmol/L  107  103   CO2 mmol/L  30.0  34.0*   BUN mg/dL  20  20   CREATININE mg/dL  0.77  1.01   GLUCOSE mg/dL  84  87   CALCIUM mg/dL  8.4*  8.7   TROPONIN I ng/mL   --   0.024     Estimated Creatinine Clearance: 81 mL/min (by C-G formula based on SCr of 0.77 mg/dL).  No results found for: BNP    Microbiology Results Abnormal     Procedure Component Value - Date/Time    Urine Culture  - Urine, Urine, Clean Catch [[id number]]  (Abnormal)  (Susceptibility) Collected:  11/09/18 1406    Lab Status:  Final result Specimen:  Urine, Clean Catch Updated:  11/11/18 1055     Urine Culture >100,000 CFU/mL Escherichia coli    Susceptibility      Escherichia coli     LILY     Ampicillin Resistant     Ampicillin + Sulbactam Intermediate     Aztreonam Susceptible     Cefepime Susceptible     Cefotaxime Susceptible     Ceftriaxone Susceptible     Cefuroxime sodium Intermediate     Cephalothin Resistant     Ciprofloxacin Resistant     Ertapenem Susceptible     Gentamicin Susceptible     Levofloxacin Resistant     Meropenem Susceptible     Nitrofurantoin Resistant     Piperacillin + Tazobactam Susceptible     Tetracycline Resistant     Tobramycin Resistant     Trimethoprim + Sulfamethoxazole Susceptible                          Imaging Results (last 24 hours)     ** No results found for the last 24 hours. **        Results for orders placed during the hospital encounter of 10/12/18   Adult Transthoracic Echo Complete W/ Cont if Necessary Per Protocol    Narrative · Estimated EF = 25-30%. Global hypokinesis  · Mild mitral valve regurgitation is present  · Mild tricuspid valve regurgitation is present.  · Calculated right ventricular systolic pressure from tricuspid   regurgitation is 37 mmHg.  · Mildly reduced right ventricular systolic function noted.          I have reviewed the medications.      aspirin 81 mg Oral Daily   atorvastatin 40 mg Oral Daily   carvedilol 6.25 mg Oral Q12H   ceftriaxone 1 g Intravenous Q24H   citalopram 20 mg Oral Daily   fludrocortisone 0.1 mg Oral Daily   furosemide 40 mg Oral Daily   heparin (porcine) 5,000 Units Subcutaneous Q12H   miconazole  Topical Q12H   pantoprazole 40 mg Oral Daily   sacubitril-valsartan 1 tablet Oral Q12H   sennosides-docusate sodium 2 tablet Oral Nightly   sodium chloride 500 mL Intravenous Once   sodium chloride 3 mL Intravenous Q12H         Assessment/Plan    Assessment / Plan     Active Hospital Problems    Diagnosis   • Acute UTI   • UTI (urinary tract infection)   • Orthostatic hypotension   • Fall   • Chronic systolic CHF (congestive heart failure) (CMS/HCC)   • Dyslipidemia   • CAD (coronary artery disease)     1. H/O [place] CABG x 5     • Anemia          Brief Hospital Course to date:  Edwardo Diggs is a 78 y.o. male Edwardo Diggs is a 78 y.o. male with PMHx of dementia, HTN, hyperlipidemia, CAD (s/p CABG x 5), chronic systolic heart failure, orthostatic hypotension and COPD on 2L NC. Brought to [facility] ED from [facility] for ~2-3 days of generalized weakness with mechanical fall. In ED found to have UTI.    Assessment & Plan:  UTI  --cont IV rocephin, cultures growing Ecoli, sensitive to rocephin, continue      Chronic orthostatic Hypotension  --normotensive since admission  --cont Florinef   --check daily orthostatics     Mechanical Fall  --CT head today without acute changes but noted old right and left frontal regions of encephalomalacia from previous insult  --PT/OT  --fall precautions     Chronic SHF  --ECHO from 10/18 with EF 25%  --BNP elevated to 2700 --> chronically elevated but increased from his baseline however no CHF exac sx's  --cont Entresto/ Lasix/ Coreg as tolerated     Pressure wounds  - WOC consult      HLD  --cont statin      CAD  --ASA, statin     DVT prophylaxis: Heparin    CODE STATUS:   Code Status and Medical Interventions:   Ordered at: 11/09/18 1906     Level Of Support Discussed With:    Patient    Health Care Surrogate     Code Status:    No CPR     Medical Interventions (Level of Support Prior to Arrest):    Comfort Measures     Comments:    Living Will       Disposition: I expect the patient to be discharged back to [facility] as soon as tomorrow pending acceptance      Electronically signed by Chelsey Parmar MD, 11/13/18, 12:54 PM.

## 2018-11-13 NOTE — PROGRESS NOTES
"Adult Nutrition  Assessment/PES    Patient Name:  Edwardo Diggs  YOB: 1940  MRN: [id number]  Admit Date:  11/9/2018    Assessment Date:  11/13/2018    Comments:      Reason for Assessment     Row Name 11/13/18 0818          Reason for Assessment    Reason For Assessment  follow-up protocol 5\"           Anthropometrics     Row Name 11/13/18 0616          Anthropometrics    Weight  77.8 kg (171 lb 8 oz)               Nutrition Prescription Ordered     Row Name 11/13/18 0819          Nutrition Prescription PO    Current PO Diet  Regular     Supplement  Ensure HP     Supplement Frequency  Daily     Common Modifiers  Cardiac                 Problem/Interventions:  Problem 1     Row Name 11/13/18 0819          Nutrition Diagnoses Problem 1    Problem 1  No Nutrition Diagnosis at this Time         Problem 2     Row Name 11/13/18 0819          Nutrition Diagnoses Problem 2    Problem 2  Nutrition Appropriate for Condition at this Time     Signs/Symptoms (evidenced by)  PO Intake     Percent (%) intake recorded  92 %     Over number of meals  3                     Education/Evaluation     Row Name 11/13/18 0820          Monitor/Evaluation    Monitor  Per protocol           Electronically signed by:  Mariama Rob RD  11/13/18 8:20 AM  "

## 2018-11-13 NOTE — PLAN OF CARE
Problem: Patient Care Overview  Goal: Plan of Care Review  Outcome: Ongoing (interventions implemented as appropriate)   11/13/18 6074   Coping/Psychosocial   Plan of Care Reviewed With patient   OTHER   Outcome Summary OT eval completed Pt reports slight dizziness which resolves quickly, Pt limited by cognition, balance, and safety, CGA for functional mobilty at RW, and took sidesteps at EOB returned supine with min A. Recom IPOT

## 2018-11-13 NOTE — THERAPY EVALUATION
Acute Care - Occupational Therapy Initial Evaluation  [facility]     Patient Name: Edwardo Diggs  : 1940  MRN: [id number]  Today's Date: 2018  Onset of Illness/Injury or Date of Surgery: 18  Date of Referral to OT: 18  Referring Physician: MD Ghulam    Admit Date: 2018       ICD-10-CM ICD-9-CM   1. Acute UTI N39.0 599.0   2. Hypotension, unspecified hypotension type I95.9 458.9   3. Generalized weakness R53.1 780.79   4. Impaired functional mobility, balance, gait, and endurance Z74.09 V49.89   5. Impaired mobility and ADLs Z74.09 799.89     Patient Active Problem List   Diagnosis   • Anemia   • Dyslipidemia   • CAD (coronary artery disease)   • History of cardioembolic cerebrovascular accident (CVA)   • Poor historian   • Chronic respiratory failure with hypoxia (CMS/HCC)   • Hx of CABG   • Chronic systolic CHF (congestive heart failure) (CMS/HCC)   • UTI (urinary tract infection)   • Orthostatic hypotension   • Fall   • Acute UTI     Past Medical History:   Diagnosis Date   • Anemia    • CHF (congestive heart failure) (CMS/HCC)    • Constipation    • Coronary artery disease    • Depression    • Dermatitis    • GERD (gastroesophageal reflux disease)    • Hyperlipidemia    • Orthostatic hypotension    • Parkinson's disease (CMS/HCC)    • Parkinson's disease (CMS/HCC)    • Stroke (CMS/HCC)    • Tinea corporis      Past Surgical History:   Procedure Laterality Date   • CATARACT EXTRACTION     • CORONARY ARTERY BYPASS GRAFT            OT ASSESSMENT FLOWSHEET (last 72 hours)      Occupational Therapy Evaluation     Row Name 18 1330                   OT Evaluation Time/Intention    Subjective Information  no complaints  -KF        Document Type  evaluation  -KF        Mode of Treatment  individual therapy;occupational therapy  -KF        Patient Effort  adequate  -KF        Symptoms Noted During/After Treatment  dizziness  -KF        Comment  pt reported mild dizziness but quickly  resolved  -KF           General Information    Patient Profile Reviewed?  yes  -KF        Onset of Illness/Injury or Date of Surgery  11/11/18  -KF        Referring Physician  MD Ghulam  -KF        Patient Observations  alert;cooperative;agree to therapy  -KF        Patient/Family Observations  no family present, supine in bed eating moderate spillage on gown, soiled chucks underneath  -KF        General Observations of Patient  Pt supine, RA, RN cleared vitals stable throughout  -KF        Prior Level of Function  independent:;ADL's per Pt report  -KF        Equipment Currently Used at Home  rollator  -KF        Pertinent History of Current Functional Problem  77 yo male from [facility] admit due to hypotension 73/45, with recent mechanical fall with facial bruising, c/o generalized weakness, dizziness, and UA (+)  -KF        Existing Precautions/Restrictions  fall;other (see comments) hypotension  -KF        Limitations/Impairments  safety/cognitive  -KF        Risks Reviewed  patient:;LOB;nausea/vomiting;dizziness;increased discomfort;change in vital signs  -KF        Benefits Reviewed  patient:;improve function;increase independence;increase strength;increase balance;decrease pain;improve skin integrity;increase knowledge  -KF        Barriers to Rehab  previous functional deficit;cognitive status  -KF           Relationship/Environment    Lives With  facility resident  -KF           Resource/Environmental Concerns    Current Living Arrangements  residential facility  -KF           Cognitive Assessment/Interventions    Additional Documentation  Cognitive Assessment/Intervention (Group)  -KF           Cognitive Assessment/Intervention- PT/OT    Affect/Mental Status (Cognitive)  confused  -KF        Orientation Status (Cognition)  oriented to;person;situation  -KF        Follows Commands (Cognition)  follows one step commands;75-90% accuracy;verbal cues/prompting required  -KF        Cognitive Function  (Cognitive)  safety deficit;memory deficit  -KF        Memory Deficit (Cognitive)  mild deficit;short term memory  -KF        Safety Deficit (Cognitive)  moderate deficit;safety precautions awareness;insight into deficits/self awareness;awareness of need for assistance  -KF        Cognitive Interventions (Cognitive)  occupation/activity based interventions;process/task specific training  -KF        Personal Safety Interventions  fall prevention program maintained;gait belt;muscle strengthening facilitated;nonskid shoes/slippers when out of bed  -KF        Cognitive Assessment/Intervention Comment  states uses walker but then when implementing functional transfer states he does need the walker, education provided on increased safety and Pt agreeable with explaination  -KF           Safety Issues, Functional Mobility    Safety Issues Affecting Function (Mobility)  awareness of need for assistance;insight into deficits/self awareness;safety precaution awareness;safety precautions follow-through/compliance  -        Impairments Affecting Function (Mobility)  cognition;strength;endurance/activity tolerance  -KF           Bed Mobility Assessment/Treatment    Bed Mobility Assessment/Treatment  rolling right;scooting/bridging;supine-sit;sit-supine  -KF        Rolling Right [place] (Bed Mobility)  supervision;verbal cues  -KF        Scooting/Bridging [place] (Bed Mobility)  verbal cues;minimum assist (75% patient effort)  -KF        Supine-Sit [place] (Bed Mobility)  minimum assist (75% patient effort);verbal cues  -KF        Sit-Supine [place] (Bed Mobility)  minimum assist (75% patient effort);verbal cues  -KF        Bed Mobility, Safety Issues  decreased use of legs for bridging/pushing;cognitive deficits limit understanding  -KF        Assistive Device (Bed Mobility)  bed rails;draw sheet;head of bed elevated  -        Comment (Bed Mobility)  cues for alignment and use of bed rails to improve  positioning   -KF           Functional Mobility    Functional Mobility- Comment  took 5 sidesteps to HOB with RW no LOB, CGA for stability at times  -KF           Transfer Assessment/Treatment    Transfer Assessment/Treatment  sit-stand transfer;stand-sit transfer  -KF        Comment (Transfers)  requires cues for HP and sequencing   -KF           Sit-Stand Transfer    Sit-Stand [place] (Transfers)  verbal cues;minimum assist (75% patient effort)  -KF        Assistive Device (Sit-Stand Transfers)  walker, front-wheeled  -KF           Stand-Sit Transfer    Stand-Sit [place] (Transfers)  contact guard;verbal cues  -KF        Assistive Device (Stand-Sit Transfers)  walker, front-wheeled  -KF           ADL Assessment/Intervention    29716 - OT Self Care/Mgmt Minutes  8  -KF        BADL Assessment/Intervention  upper body dressing;toileting;feeding  -KF           Upper Body Dressing Assessment/Training    Upper Body Dressing [place] Level  doff;don;front opening garment;minimum assist (75% patient effort);verbal cues  -KF        Upper Body Dressing Position  edge of bed sitting  -KF        Comment (Upper Body Dressing)  min A for lines and threading UEs throughout sleeves  -KF           Self-Feeding Assessment/Training    [place] Level (Feeding)  prepare tray/open items;maximum assist (25% patient effort);scoop food and bring to mouth;set up;liquids to mouth;minimum assist (75% patient effort)  -KF        Self-Feeding Assess/Train, Spillage Amount  moderate  -KF        Position (Self-Feeding)  sitting up in bed;supine  -KF        Comment (Feeding)  denied repositioning for self-feeding, moderate spillage on gown with self feeding soup and salad; uses regular utensils would benefit from positioning prior to eating if Pt agreeable   -KF           Toileting Assessment/Training    [place] Level (Toileting)  toileting skills;set up  -KF        Assistive Devices (Toileting)  urinal  -KF         Toileting Position  supine;sitting up in bed  -KF        Comment (Toileting)  with set up Pt able to use urinal without assistance   -KF           BADL Safety/Performance    Impairments, BADL Safety/Performance  balance;cognition;endurance/activity tolerance;strength  -KF        Cognitive Impairments, BADL Safety/Performance  awareness, need for assistance;insight into deficits/self awareness;safety precaution awareness;safety precaution follow-through  -KF        Skilled BADL Treatment/Intervention  BADL process/adaptation training;cognitive/safety deficit modifications  -KF           General ROM    GENERAL ROM COMMENTS  BUE AROM WFL  -KF           MMT (Manual Muscle Testing)    General MMT Comments   (B) 3+/5; (B) shoulder and elbow grossly 4-/5  -KF           Motor Assessment/Interventions    Additional Documentation  Balance (Group);Balance Interventions (Group);Gross Motor Coordination (Group);Fine Motor Testing & Training (Group)  -KF           Gross Motor Coordination    Gross Motor Impairments  balance  -KF        Gross Motor Skill, Impairments Detail  BUE WFL demonstrated with self-feeding and AROM   -KF           Balance    Balance  static sitting balance;static standing balance;dynamic sitting balance;dynamic standing balance  -KF           Static Sitting Balance    Level of [place] (Unsupported Sitting, Static Balance)  supervision  -KF        Sitting Position (Unsupported Sitting, Static Balance)  sitting on edge of bed  -KF        Time Able to Maintain Position (Unsupported Sitting, Static Balance)  1 to 2 minutes  -KF           Dynamic Sitting Balance    Level of [place], Reaches Outside Midline (Sitting, Dynamic Balance)  standby assist  -KF        Sitting Position, Reaches Outside Midline (Sitting, Dynamic Balance)  sitting on edge of bed  -KF        Comment, Reaches Outside Midline (Sitting, Dynamic Balance)  SBA unsupported with UB dressing at times unsteady  -KF           Static  Standing Balance    Level of [place] (Supported Standing, Static Balance)  contact guard assist  -KF        Time Able to Maintain Position (Supported Standing, Static Balance)  30 to 45 seconds  -KF        Assistive Device Utilized (Supported Standing, Static Balance)  rolling walker  -KF           Dynamic Standing Balance    Level of [place], Reaches Outside Midline (Standing, Dynamic Balance)  contact guard assist  -KF        Time Able to Maintain Position, Reaches Outside Midline (Standing, Dynamic Balance)  15 to 30 seconds  -KF        Comment, Reaches Outside Midline (Standing, Dynamic Balance)  no LOB at RW side steps   -KF           Fine Motor Testing & Training    Training Activity, Fine Motor Coordination  manipulation of eating utensils  -KF        Comment, Fine Motor Coordination  mild fine motor coordination deficits demonstrated with spillage during self feeding   -KF           Sensory Assessment/Intervention    Sensory General Assessment  no sensation deficits identified  -KF        Additional Documentation  Vision Assessment/Intervention (Group)  -KF           Vision Assessment/Intervention    Visual Impairment/Limitations  WFL;corrective lenses full time per Pt report  -KF           Positioning and Restraints    Pre-Treatment Position  in bed  -KF        Post Treatment Position  bed  -KF        In Bed  notified nsg;supine;fowlers;call light within reach;encouraged to call for assist;exit alarm on;legs elevated  -KF           Pain Assessment    Additional Documentation  Pain Scale: Numbers Pre/Post-Treatment (Group)  -KF           Pain Scale: Numbers Pre/Post-Treatment    Pain Scale: Numbers, Pretreatment  0/10 - no pain  -KF        Pain Scale: Numbers, Post-Treatment  0/10 - no pain  -KF        Pain Intervention(s)  Repositioned;Ambulation/increased activity  -KF           Wound 11/10/18 2000 Left distal;lower;lateral arm skin tear    Wound - Properties Group Date first assessed: 11/10/18   -KO Time first assessed: 2000  -KO Side: Left  -KO Orientation: distal;lower;lateral  -KO Location: arm  -KO Type: skin tear  -KO Additional Comments: Skin tear from fall prior to admit.   -KO       Wound 11/10/18 2000 Bilateral  groin MASD (moisture associated skin damage)    Wound - Properties Group Date first assessed: 11/10/18  -KO Time first assessed: 2000  -KO Side: Bilateral  -KO Orientation: --  -KO, Groin and inner thighs  Location: groin  -KO Type: MASD (moisture associated skin damage)  -KO       Wound 11/10/18 2000 Left anterior knee abrasion    Wound - Properties Group Date first assessed: 11/10/18  -KO Time first assessed: 2000  -KO Present On Admission : yes  -KO Side: Left  -KO Orientation: anterior  -KO Location: knee  -KO Type: abrasion  -KO       Plan of Care Review    Plan of Care Reviewed With  patient  -KF           Clinical Impression (OT)    Date of Referral to OT  11/12/18  -KF        OT Diagnosis  ADL decline  -KF        Patient/Family Goals Statement (OT Eval)  PLOF  -KF        Criteria for Skilled Therapeutic Interventions Met (OT Eval)  yes;treatment indicated  -KF        Rehab Potential (OT Eval)  fair, will monitor progress closely  -KF        Therapy Frequency (OT Eval)  daily  -KF        Care Plan Review (OT)  evaluation/treatment results reviewed;risks/benefits reviewed;care plan/treatment goals reviewed;current/potential barriers reviewed;patient/other agree to care plan  -KF        Anticipated Discharge Disposition (OT)  skilled nursing facility  -KF           Vital Signs    Pre Systolic BP Rehab  112  -KF        Pre Treatment Diastolic BP  82 only mild dizziness which resolved quickly, RN cleared  -KF        Pretreatment Heart Rate (beats/min)  65  -KF        Posttreatment Heart Rate (beats/min)  66  -KF        O2 Delivery Pre Treatment  room air  -KF        Pre Patient Position  Supine  -KF        Intra Patient Position  Standing  -KF        Post Patient Position  Supine  -KF            Planned OT Interventions    Planned Therapy Interventions (OT Eval)  activity tolerance training;adaptive equipment training;BADL retraining;cognitive/visual perception retraining;functional balance retraining;occupation/activity based interventions;patient/caregiver education/training;ROM/therapeutic exercise;strengthening exercise;transfer/mobility retraining  -KF           OT Goals    Bed Mobility Goal Selection (OT)  bed mobility, OT goal 1  -KF        Transfer Goal Selection (OT)  transfer, OT goal 1  -KF        Dressing Goal Selection (OT)  dressing, OT goal 1  -KF           Bed Mobility Goal 1 (OT)    Activity/Assistive Device (Bed Mobility Goal 1, OT)  sit to supine/supine to sit  -KF        [place] Level/Cues Needed (Bed Mobility Goal 1, OT)  contact guard assist;verbal cues required  -KF        Time Frame (Bed Mobility Goal 1, OT)  long term goal (LTG);by discharge  -KF        Progress/Outcomes (Bed Mobility Goal 1, OT)  goal ongoing  -KF           Transfer Goal 1 (OT)    Activity/Assistive Device (Transfer Goal 1, OT)  sit-to-stand/stand-to-sit;bed-to-chair/chair-to-bed;walker, rolling  -KF        [place] Level/Cues Needed (Transfer Goal 1, OT)  standby assist;verbal cues required  -KF        Time Frame (Transfer Goal 1, OT)  long term goal (LTG);by discharge  -KF        Progress/Outcome (Transfer Goal 1, OT)  goal ongoing  -KF           Dressing Goal 1 (OT)    Activity/Assistive Device (Dressing Goal 1, OT)  lower body dressing socks  -KF        [place]/Cues Needed (Dressing Goal 1, OT)  set-up required  -KF        Time Frame (Dressing Goal 1, OT)  long term goal (LTG);by discharge  -KF        Progress/Outcome (Dressing Goal 1, OT)  goal ongoing  -KF          User Key  (r) = Recorded By, (t) = Taken By, (c) = Cosigned By    Initials Name Effective Dates    Carole Goodwin RN 06/16/16 -     KF Muna Pennington OT 04/03/18 -          Occupational Therapy Education     Title: PT  OT SLP Therapies (Active)     Topic: Occupational Therapy (Active)     Point: ADL training (Active)     Description: Instruct learner(s) on proper safety adaptation and remediation techniques during self care or transfers.   Instruct in proper use of assistive devices.    Learning Progress Summary           Patient Acceptance, E, NR by  at 11/13/2018  1:30 PM    Comment:  Purpose of skilled OT services, safety awareness with use of RW positioning,  bed mobility sequencing and use of bed rails                   Point: Precautions (Active)     Description: Instruct learner(s) on prescribed precautions during self-care and functional transfers.    Learning Progress Summary           Patient Acceptance, E, NR by  at 11/13/2018  1:30 PM    Comment:  Purpose of skilled OT services, safety awareness with use of RW positioning,  bed mobility sequencing and use of bed rails                   Point: Body mechanics (Active)     Description: Instruct learner(s) on proper positioning and spine alignment during self-care, functional mobility activities and/or exercises.    Learning Progress Summary           Patient Acceptance, E, NR by  at 11/13/2018  1:30 PM    Comment:  Purpose of skilled OT services, safety awareness with use of RW positioning,  bed mobility sequencing and use of bed rails                               User Key     Initials Effective Dates Name Provider Type Discipline     04/03/18 -  Muna Pennington, OT Occupational Therapist OT                  OT Recommendation and Plan  Outcome Summary/Treatment Plan (OT)  Anticipated Discharge Disposition (OT): skilled nursing facility  Planned Therapy Interventions (OT Eval): activity tolerance training, adaptive equipment training, BADL retraining, cognitive/visual perception retraining, functional balance retraining, occupation/activity based interventions, patient/caregiver education/training, ROM/therapeutic exercise, strengthening exercise,  transfer/mobility retraining  Therapy Frequency (OT Eval): daily  Plan of Care Review  Plan of Care Reviewed With: patient  Plan of Care Reviewed With: patient  Outcome Summary: OT eval completed Pt reports slight dizziness which resolves quickly, Pt limited by cognition, balance, and safety, CGA for functional mobilty at RW, and took sidesteps at EOB returned supine with min A. Recom IPOT     Outcome Measures     Row Name 11/13/18 1330             How much help from another is currently needed...    Putting on and taking off regular lower body clothing?  2  -KF      Bathing (including washing, rinsing, and drying)  2  -KF      Toileting (which includes using toilet bed pan or urinal)  2  -KF      Putting on and taking off regular upper body clothing  3  -KF      Taking care of personal grooming (such as brushing teeth)  3  -KF      Eating meals  3  -KF      Score  15  -KF         Functional Assessment    Outcome Measure Options  AM-PAC 6 Clicks Daily Activity (OT)  -KF        User Key  (r) = Recorded By, (t) = Taken By, (c) = Cosigned By    Initials Name Provider Type    Muna Denton OT Occupational Therapist          Time Calculation:   Time Calculation- OT     Row Name 11/13/18 0040             Time Calculation- OT    OT Start Time  1330  -KF      Total Timed Code Minutes- OT  8 minute(s)  -KF      OT Received On  11/13/18  -KF      OT Goal Re-Cert Due Date  11/23/18  -KF         Timed Charges    91063 - OT Self Care/Mgmt Minutes  8  -KF        User Key  (r) = Recorded By, (t) = Taken By, (c) = Cosigned By    Initials Name Provider Type    Muna Denton OT Occupational Therapist        Therapy Suggested Charges     Code   Minutes Charges    95162 (CPT®) Hc Ot Neuromusc Re Education Ea 15 Min      91209 (CPT®) Hc Ot Ther Proc Ea 15 Min      73053 (CPT®) Hc Ot Therapeutic Act Ea 15 Min      10720 (CPT®) Hc Ot Manual Therapy Ea 15 Min      73589 (CPT®) Hc Ot Iontophoresis Ea 15 Min      06841  (CPT®) Hc Ot Elec Stim Ea-Per 15 Min      13285 (CPT®) Hc Ot Ultrasound Ea 15 Min      27258 (CPT®) Hc Ot Self Care/Mgmt/Train Ea 15 Min 8 1    Total  8 1        Therapy Charges for Today     Code Description Service Date Service Provider Modifiers Qty    09119018777 HC OT SELF CARE/MGMT/TRAIN EA 15 MIN 11/13/2018 Muna Pennington, OT GO 1    20704951321 HC OT EVAL MOD COMPLEXITY 3 11/13/2018 Muna Pennington, OT GO 1    16690900911 HC OT SELFCARE CURRENT 11/13/2018 Muna Pennington, OT BIBI, CK 1    26892124860 HC OT SELFCARE PROJECTED 11/13/2018 Muna Pennington, OT BIBI, CJ 1          OT G-codes  OT Professional Judgement Used?: Yes  OT Functional Scales Options: AM-PAC 6 Clicks Daily Activity (OT)  Functional Assessment Tool Used: 6 clicks  Score: 15  Functional Limitation: Self care  Self Care Current Status (): At least 40 percent but less than 60 percent impaired, limited or restricted  Self Care Goal Status (): At least 20 percent but less than 40 percent impaired, limited or restricted    Muna Pennington OT  11/13/2018

## 2018-11-13 NOTE — PROGRESS NOTES
Continued Stay Note  [facility]     Patient Name: Edwardo Diggs  MRN: [id number]  Today's Date: 11/13/2018    Admit Date: 11/9/2018    Discharge Plan     Row Name 11/13/18 1419       Plan    Plan  Long term care at [facility]    Patient/Family in Agreement with Plan  yes    Plan Comments  Spoke with patient at bedside.  Patient plans to return to [facility] when medically ready.  Spoke with Dr. Parmar.  She has talked with Dr. Purcell at [facility]. They will accept patient back tomorrow 11/14 for transfer to his long term care bed.   Message left with patient's son Antwon to update on plan and need for family transportation tomorrow.  Case management will continue to follow for discharge planning needs.      Final Discharge Disposition Code  04 - intermediate care facility        Discharge Codes    No documentation.             Dodie Nelson RN

## 2018-11-14 VITALS
SYSTOLIC BLOOD PRESSURE: 123 MMHG | RESPIRATION RATE: 16 BRPM | BODY MASS INDEX: 23.25 KG/M2 | OXYGEN SATURATION: 97 % | DIASTOLIC BLOOD PRESSURE: 63 MMHG | TEMPERATURE: 95 F | HEIGHT: 70 IN | HEART RATE: 71 BPM | WEIGHT: 162.4 LBS

## 2018-11-14 PROCEDURE — G0378 HOSPITAL OBSERVATION PER HR: HCPCS

## 2018-11-14 PROCEDURE — 99217 PR OBSERVATION CARE DISCHARGE MANAGEMENT: CPT | Performed by: INTERNAL MEDICINE

## 2018-11-14 PROCEDURE — 25010000002 HEPARIN (PORCINE) PER 1000 UNITS: Performed by: NURSE PRACTITIONER

## 2018-11-14 PROCEDURE — 96372 THER/PROPH/DIAG INJ SC/IM: CPT

## 2018-11-14 RX ORDER — AMOXICILLIN AND CLAVULANATE POTASSIUM 875; 125 MG/1; MG/1
1 TABLET, FILM COATED ORAL EVERY 12 HOURS SCHEDULED
Qty: 10 TABLET | Refills: 0 | Status: SHIPPED | OUTPATIENT
Start: 2018-11-14 | End: 2018-11-14

## 2018-11-14 RX ORDER — AMOXICILLIN AND CLAVULANATE POTASSIUM 875; 125 MG/1; MG/1
1 TABLET, FILM COATED ORAL EVERY 12 HOURS SCHEDULED
Status: DISCONTINUED | OUTPATIENT
Start: 2018-11-14 | End: 2018-11-14 | Stop reason: HOSPADM

## 2018-11-14 RX ORDER — AMOXICILLIN AND CLAVULANATE POTASSIUM 875; 125 MG/1; MG/1
1 TABLET, FILM COATED ORAL EVERY 12 HOURS SCHEDULED
Qty: 6 TABLET | Refills: 0 | Status: SHIPPED | OUTPATIENT
Start: 2018-11-14 | End: 2018-11-17

## 2018-11-14 RX ADMIN — HEPARIN SODIUM 5000 UNITS: 5000 INJECTION, SOLUTION INTRAVENOUS; SUBCUTANEOUS at 08:41

## 2018-11-14 RX ADMIN — PANTOPRAZOLE SODIUM 40 MG: 40 TABLET, DELAYED RELEASE ORAL at 05:52

## 2018-11-14 RX ADMIN — FUROSEMIDE 40 MG: 40 TABLET ORAL at 08:40

## 2018-11-14 RX ADMIN — ATORVASTATIN CALCIUM 40 MG: 40 TABLET, FILM COATED ORAL at 08:41

## 2018-11-14 RX ADMIN — ASPIRIN 81 MG: 81 TABLET, COATED ORAL at 08:40

## 2018-11-14 RX ADMIN — Medication 3 ML: at 08:43

## 2018-11-14 RX ADMIN — AMOXICILLIN AND CLAVULANATE POTASSIUM 1 TABLET: 875; 125 TABLET, FILM COATED ORAL at 08:43

## 2018-11-14 RX ADMIN — FLUDROCORTISONE ACETATE 0.1 MG: 0.1 TABLET ORAL at 08:40

## 2018-11-14 RX ADMIN — MICONAZOLE NITRATE: 2 CREAM TOPICAL at 08:44

## 2018-11-14 RX ADMIN — CARVEDILOL 6.25 MG: 6.25 TABLET, FILM COATED ORAL at 08:40

## 2018-11-14 RX ADMIN — CITALOPRAM HYDROBROMIDE 20 MG: 20 TABLET ORAL at 08:41

## 2018-11-14 RX ADMIN — SACUBITRIL AND VALSARTAN 1 TABLET: 24; 26 TABLET, FILM COATED ORAL at 08:41

## 2018-11-14 NOTE — DISCHARGE PLACEMENT REQUEST
"Dodie Nelson  [phone]  Transfer Summary    Trena Diggs (78 y.o. Male)     Date of Birth Social Security Number Address Home Phone MRN    1940  [facility] JACOB RUIZ [address] [place] [address] [phone] [id number]    Confucianism Marital Status          None        Admission Date Admission Type Admitting Provider Attending Provider Department, Room/Bed    18 Emergency Chelsey Parmar MD Hunter, Sarah M, MD [address], S461/1    Discharge Date Discharge Disposition Discharge Destination         Skilled Nursing Facility (DC - External)              Attending Provider:  Chelsey Parmar MD    Allergies:  Eggs Or Egg-derived Products    Isolation:  None   Infection:  None   Code Status:  No CPR    Ht:  177.8 cm (70\")   Wt:  73.7 kg (162 lb 6.4 oz)    Admission Cmt:  None   Principal Problem:  None                Active Insurance as of 2018     Primary Coverage     Payor Plan Insurance Group Employer/Plan Group    [place] [id number]     Payor Plan Address Payor Plan Phone Number Payor Plan Fax Number Effective Dates    [phone]   1998 - None Entered    [place] [id number]       Subscriber Name Subscriber Birth Date Member ID       TRENA DIGGS 1940 [id number]                 Emergency Contacts      (Rel.) Home Phone Work Phone Mobile Phone    Antwon Diggs (Son) -- -- [phone]               Discharge Summary      Chelsey Parmar MD at 2018  7:26 AM              [facility]  DISCHARGE SUMMARY    Patient Name: Trena Diggs  : 1940  MRN: [id number]    Date of Admission: 2018  Date of Discharge:  2018  Primary Care Physician: Provider, No Known    Consults     Date and Time Order Name Status Description    10/12/2018 1705 Inpatient Cardiology Consult Completed         Hospital Course     Presenting Problem:   UTI (urinary tract infection) [N39.0]  Acute UTI " [N39.0]    Active Hospital Problems    Diagnosis Date Noted   • Acute UTI [N39.0] 11/12/2018   • UTI (urinary tract infection) [N39.0] 11/09/2018   • Orthostatic hypotension [I95.1] 11/09/2018   • Fall [W19.XXXA] 11/09/2018   • Chronic systolic CHF (congestive heart failure) (CMS/[facility]) [I50.22] 10/16/2018   • Dyslipidemia [E78.5] 10/13/2018   • CAD (coronary artery disease) [I25.10] 10/13/2018   • Anemia [D64.9] 10/12/2018      Resolved Hospital Problems   No resolved problems to display.          Hospital Course:  Edwardo Diggs is a 78 y.o. male with history CAD, anemia, orthostatic hypotension who presented to the [facility] after a mechanical fall and confusion. Patient on workup was found to have a UTI. Patient was admitted to the hospitalist service for further evaluation and workup. He was started on IV abx and significantly improved over hospital day 1. Confusion resolved quickly. Patient was noted to have some bruising on his face s/p mechanical fall, CT imaging was done and negative for any acute fx or abnl. Urine culture resulted with Ecoli, which was sensitive to rocephin which he was treated with for 2 days inpatient , subsequently transitioned to oral augmentin based on sensitivities upon discharge. Case was discussed with Dr. Purcell at [facility] and patient was accepted back for transfer on 11/14 in stable condition. No other medication changes were made.       Discharge Follow Up Recommendations for labs/diagnostics:  Follow up with Dr. Purcell at [facility]    Day of Discharge     HPI:   No overnight events. No changes noted.     Review of Systems      Otherwise ROS is negative except as mentioned in the HPI.    Vital Signs:   Temp:  [95 °F (35 °C)] 95 °F (35 °C)  Heart Rate:  [64-69] 69  Resp:  [16] 16  BP: (109-112)/(74-82) 109/74     Physical Exam:  GEN- no acute distress noted, resting in bed, awake  HEENT- atraumatic, normocephlic, eomi  NECK- supple, trachea midline, no masses  RESP:  ctab, normal effort  CV: no murmurs, s1/s2, rrr  MSK: no edema noted, spontaneous movement of all extremities  NEURO: alert, oriented, no focal deficits  SKIN: no rashes  PSYCH: appropriate mood and affect        Pertinent  and/or Most Recent Results     Results from last 7 days   Lab Units  11/10/18   0539  11/09/18   1257   WBC 10*3/mm3  8.48  10.57   HEMOGLOBIN g/dL  10.9*  11.4*   HEMATOCRIT %  35.8*  37.3*   PLATELETS 10*3/mm3  168  158   SODIUM mmol/L  145  140   POTASSIUM mmol/L  3.6  3.5   CHLORIDE mmol/L  107  103   CO2 mmol/L  30.0  34.0*   BUN mg/dL  20  20   CREATININE mg/dL  0.77  1.01   GLUCOSE mg/dL  84  87   CALCIUM mg/dL  8.4*  8.7           Invalid input(s): PROT, LABALBU        Invalid input(s): TG, LDLCALC, LDLREALC  Results from last 7 days   Lab Units  11/09/18   1257   BNP pg/mL  2,726.0*   TROPONIN I ng/mL  0.024     Brief Urine Lab Results  (Last result in the past 365 days)      Color   Clarity   Blood   Leuk Est   Nitrite   Protein   CREAT   Urine HCG        11/09/18 1406 Yellow Turbid Moderate (2+) Large (3+) Positive 100 mg/dL (2+)               Microbiology Results Abnormal     Procedure Component Value - Date/Time    Urine Culture - Urine, Urine, Clean Catch [[id number]]  (Abnormal)  (Susceptibility) Collected:  11/09/18 1406    Lab Status:  Final result Specimen:  Urine, Clean Catch Updated:  11/11/18 1055     Urine Culture >100,000 CFU/mL Escherichia coli    Susceptibility      Escherichia coli     LILY     Ampicillin Resistant     Ampicillin + Sulbactam Intermediate     Aztreonam Susceptible     Cefepime Susceptible     Cefotaxime Susceptible     Ceftriaxone Susceptible     Cefuroxime sodium Intermediate     Cephalothin Resistant     Ciprofloxacin Resistant     Ertapenem Susceptible     Gentamicin Susceptible     Levofloxacin Resistant     Meropenem Susceptible     Nitrofurantoin Resistant     Piperacillin + Tazobactam Susceptible     Tetracycline Resistant     Tobramycin Resistant      Trimethoprim + Sulfamethoxazole Susceptible                          Imaging Results (all)     Procedure Component Value Units Date/Time    CT Head Without Contrast [[id number]] Collected:  11/09/18 1344     Updated:  11/12/18 0828    Narrative:       EXAMINATION: CT HEAD WO CONTRAST - 11/9/2018     INDICATION: Previous fall day ago, facial injuries.      TECHNIQUE: Axial CT of the head without intravenous contrast  administration.     The radiation dose reduction device was turned on for each scan per the  ALARA (As Low as Reasonably Achievable) protocol.     COMPARISON: None.     FINDINGS: Midline structures are without midline shift or mass effect.  No hydrocephalus. Large area of encephalomalacia from prior insult right  frontotemporal region and left anterior frontal regions without acute  intracranial hemorrhage or extra-axial fluid collection. Basal cisterns  are patent. Globes and orbits unremarkable without intraconal  abnormality or disruption of the globes. Visualized paranasal sinuses  and mastoid air cells demonstrate minimal mucosal circumferential  thickening of the right maxillary sinus; otherwise grossly clear and  well-pneumatized. Calvarium intact without depressed calvarial fracture.       Impression:       No acute intracranial abnormality or evidence for depressed  calvarial fracture with calvarium intact. Noted bifrontal and right  frontotemporal areas of encephalomalacia from prior insult without acute  intra-axial hemorrhage or extra-axial fluid collection.     DICTATED:   11/9/2018  EDITED/ls :   11/9/2018      This report was finalized on 11/12/2018 8:26 AM by Dr. Iglesia Goemz.       CT Abdomen Pelvis Without Contrast [[id number]] Collected:  11/09/18 1345     Updated:  11/12/18 0828    Narrative:       EXAMINATION: CT ABDOMEN PELVIS WO CONTRAST - 11/9/2018     INDICATION: Abdominal tenderness, dementia.      TECHNIQUE: CT abdomen and pelvis without intravenous contrast  administration.      The radiation dose reduction device was turned on for each scan per the  ALARA (As Low as Reasonably Achievable) protocol.     COMPARISON: None.     FINDINGS: Lung bases demonstrate small right and trace left pleural  effusions with adjacent subsegmental atelectasis. Liver without focal  lesion. Gallbladder demonstrates contracted appearance with several  calcific densities in the dependent portion indicating cholelithiasis.  No gross intrahepatic biliary or extrahepatic biliary dilatation.  Pancreas and spleen grossly unremarkable. Adrenals have questionable  thickening bilaterally however no distinct nodule. Kidneys without  hydronephrosis or hydroureter demonstrating a 2 mm nonobstructing left  superior pole renal calculus. No obstructive uropathy or urolithiasis is  otherwise evident. Atherosclerotic nonaneurysmal abdominal aorta. No  bulky retroperitoneal adenopathy. GI tract evaluation demonstrates small  hiatal hernia however no disproportionate dilatation of bowel to suggest  mechanical obstructive process. Sigmoid diverticulosis without evidence  for acute diverticulitis. No loculated intra-abdominal fluid collection  with scattered mesenteric edema however no overt ascites. Pelvic viscera  demonstrate mild to moderate distention of the urinary bladder with  circumferential thickening of the bladder wall and surrounding stranding  concerning for cystitis. No loculated pelvic collection or bulky pelvic  adenopathy otherwise. Degenerative changes of the spine without  aggressive osseous or soft tissue body wall lesions.       Impression:       1. Mild to moderate distention of the urinary bladder with  circumferential bladder wall thickening and surrounding inflammatory  stranding concerning for cystitis without bladder calculi identified or  obstructive uropathy otherwise noted. No hydronephrosis.  2. Sigmoid diverticulosis without evidence for acute diverticulitis.  3. Small right and trace left pleural  effusions with adjacent  atelectasis.     DICTATED:   11/9/2018  EDITED/ls :   11/9/2018      This report was finalized on 11/12/2018 8:26 AM by Dr. Iglesia Gomez.                       Results for orders placed during the hospital encounter of 10/12/18   Adult Transthoracic Echo Complete W/ Cont if Necessary Per Protocol    Narrative · Estimated EF = 25-30%. Global hypokinesis  · Mild mitral valve regurgitation is present  · Mild tricuspid valve regurgitation is present.  · Calculated right ventricular systolic pressure from tricuspid   regurgitation is 37 mmHg.  · Mildly reduced right ventricular systolic function noted.            Discharge Details        Discharge Medications      New Medications      Instructions Start Date   amoxicillin-clavulanate 875-125 MG per tablet  Commonly known as:  AUGMENTIN   1 tablet, Oral, Every 12 Hours Scheduled      miconazole 2 % cream  Commonly known as:  MICOTIN   Topical, Every 12 Hours Scheduled         Continue These Medications      Instructions Start Date   acetaminophen 325 MG tablet  Commonly known as:  TYLENOL   650 mg, Oral, Every 4 Hours PRN      aspirin 81 MG EC tablet   81 mg, Oral, Daily      atorvastatin 40 MG tablet  Commonly known as:  LIPITOR   40 mg, Oral, Daily      carvedilol 6.25 MG tablet  Commonly known as:  COREG   6.25 mg, Oral, Every 12 Hours Scheduled      citalopram 20 MG tablet  Commonly known as:  CeleXA   20 mg, Oral, Daily      docusate sodium 100 MG capsule  Commonly known as:  COLACE   200 mg, Oral, Daily      fludrocortisone 0.1 MG tablet   0.1 mg, Oral, Daily      furosemide 40 MG tablet  Commonly known as:  LASIX   40 mg, Oral, Daily      pantoprazole 40 MG EC tablet  Commonly known as:  PROTONIX   40 mg, Oral, Daily      Propylene Glycol 0.6 % solution   0.6 %, Ophthalmic, 3 Times Daily      sacubitril-valsartan 24-26 MG tablet  Commonly known as:  ENTRESTO   1 tablet, Oral, Every 12 Hours Scheduled               Discharge  Disposition:  Skilled Nursing Facility (DC - External)    Discharge Diet:  As tolerated    Discharge Activity:   As tolerated          Code Status/Level of Support:  Code Status and Medical Interventions:   Ordered at: 11/09/18 1906     Level Of Support Discussed With:    Patient    Health Care Surrogate     Code Status:    No CPR     Medical Interventions (Level of Support Prior to Arrest):    Comfort Measures     Comments:    Living Will       No future appointments.    Additional Instructions for the Follow-ups that You Need to Schedule     Discharge Follow-up with PCP   As directed       Currently Documented PCP:    Provider, No Known    PCP Phone Number:    [phone]     Follow Up Details:  Dr. Purcell at [facility]               Time Spent on Discharge:  35 minutes    Electronically signed by Chelsey Parmar MD, 11/14/18, 7:26 AM.        Electronically signed by Chelsey Parmar MD at 11/14/2018  7:31 AM

## 2018-11-14 NOTE — DISCHARGE SUMMARY
[facility] Medicine Services  DISCHARGE SUMMARY    Patient Name: Edwardo Diggs  : 1940  MRN: [id number]    Date of Admission: 2018  Date of Discharge:  2018  Primary Care Physician: Provider, No Known    Consults     Date and Time Order Name Status Description    10/12/2018 1705 Inpatient Cardiology Consult Completed         Hospital Course     Presenting Problem:   UTI (urinary tract infection) [N39.0]  Acute UTI [N39.0]    Active Hospital Problems    Diagnosis Date Noted   • Acute UTI [N39.0] 2018   • UTI (urinary tract infection) [N39.0] 2018   • Orthostatic hypotension [I95.1] 2018   • Fall [W19.XXXA] 2018   • Chronic systolic CHF (congestive heart failure) (CMS/[facility]) [I50.22] 10/16/2018   • Dyslipidemia [E78.5] 10/13/2018   • CAD (coronary artery disease) [I25.10] 10/13/2018   • Anemia [D64.9] 10/12/2018      Resolved Hospital Problems   No resolved problems to display.          Hospital Course:  Edwardo Diggs is a 78 y.o. male with history CAD, anemia, orthostatic hypotension who presented to the [facility] after a mechanical fall and confusion. Patient on workup was found to have a UTI. Patient was admitted to the hospitalist service for further evaluation and workup. He was started on IV abx and significantly improved over hospital day 1. Confusion resolved quickly. Patient was noted to have some bruising on his face s/p mechanical fall, CT imaging was done and negative for any acute fx or abnl. Urine culture resulted with Ecoli, which was sensitive to rocephin which he was treated with for 2 days inpatient , subsequently transitioned to oral augmentin based on sensitivities upon discharge. Case was discussed with Dr. Purcell at [facility] and patient was accepted back for transfer on  in stable condition. No other medication changes were made.       Discharge Follow Up Recommendations for labs/diagnostics:  Follow up with   Binh at [facility]    Day of Discharge     HPI:   No overnight events. No changes noted.     Review of Systems      Otherwise ROS is negative except as mentioned in the HPI.    Vital Signs:   Temp:  [95 °F (35 °C)] 95 °F (35 °C)  Heart Rate:  [64-69] 69  Resp:  [16] 16  BP: (109-112)/(74-82) 109/74     Physical Exam:  GEN- no acute distress noted, resting in bed, awake  HEENT- atraumatic, normocephlic, eomi  NECK- supple, trachea midline, no masses  RESP: ctab, normal effort  CV: no murmurs, s1/s2, rrr  MSK: no edema noted, spontaneous movement of all extremities  NEURO: alert, oriented, no focal deficits  SKIN: no rashes  PSYCH: appropriate mood and affect        Pertinent  and/or Most Recent Results     Results from last 7 days   Lab Units  11/10/18   0539  11/09/18   1257   WBC 10*3/mm3  8.48  10.57   HEMOGLOBIN g/dL  10.9*  11.4*   HEMATOCRIT %  35.8*  37.3*   PLATELETS 10*3/mm3  168  158   SODIUM mmol/L  145  140   POTASSIUM mmol/L  3.6  3.5   CHLORIDE mmol/L  107  103   CO2 mmol/L  30.0  34.0*   BUN mg/dL  20  20   CREATININE mg/dL  0.77  1.01   GLUCOSE mg/dL  84  87   CALCIUM mg/dL  8.4*  8.7           Invalid input(s): PROT, LABALBU        Invalid input(s): TG, LDLCALC, LDLREALC  Results from last 7 days   Lab Units  11/09/18   1257   BNP pg/mL  2,726.0*   TROPONIN I ng/mL  0.024     Brief Urine Lab Results  (Last result in the past 365 days)      Color   Clarity   Blood   Leuk Est   Nitrite   Protein   CREAT   Urine HCG        11/09/18 1406 Yellow Turbid Moderate (2+) Large (3+) Positive 100 mg/dL (2+)               Microbiology Results Abnormal     Procedure Component Value - Date/Time    Urine Culture - Urine, Urine, Clean Catch [[id number]]  (Abnormal)  (Susceptibility) Collected:  11/09/18 1406    Lab Status:  Final result Specimen:  Urine, Clean Catch Updated:  11/11/18 1055     Urine Culture >100,000 CFU/mL Escherichia coli    Susceptibility      Escherichia coli     LILY     Ampicillin Resistant      Ampicillin + Sulbactam Intermediate     Aztreonam Susceptible     Cefepime Susceptible     Cefotaxime Susceptible     Ceftriaxone Susceptible     Cefuroxime sodium Intermediate     Cephalothin Resistant     Ciprofloxacin Resistant     Ertapenem Susceptible     Gentamicin Susceptible     Levofloxacin Resistant     Meropenem Susceptible     Nitrofurantoin Resistant     Piperacillin + Tazobactam Susceptible     Tetracycline Resistant     Tobramycin Resistant     Trimethoprim + Sulfamethoxazole Susceptible                          Imaging Results (all)     Procedure Component Value Units Date/Time    CT Head Without Contrast [[id number]] Collected:  11/09/18 1344     Updated:  11/12/18 0828    Narrative:       EXAMINATION: CT HEAD WO CONTRAST - 11/9/2018     INDICATION: Previous fall day ago, facial injuries.      TECHNIQUE: Axial CT of the head without intravenous contrast  administration.     The radiation dose reduction device was turned on for each scan per the  ALARA (As Low as Reasonably Achievable) protocol.     COMPARISON: None.     FINDINGS: Midline structures are without midline shift or mass effect.  No hydrocephalus. Large area of encephalomalacia from prior insult right  frontotemporal region and left anterior frontal regions without acute  intracranial hemorrhage or extra-axial fluid collection. Basal cisterns  are patent. Globes and orbits unremarkable without intraconal  abnormality or disruption of the globes. Visualized paranasal sinuses  and mastoid air cells demonstrate minimal mucosal circumferential  thickening of the right maxillary sinus; otherwise grossly clear and  well-pneumatized. Calvarium intact without depressed calvarial fracture.       Impression:       No acute intracranial abnormality or evidence for depressed  calvarial fracture with calvarium intact. Noted bifrontal and right  frontotemporal areas of encephalomalacia from prior insult without acute  intra-axial hemorrhage or  extra-axial fluid collection.     DICTATED:   11/9/2018  EDITED/ls :   11/9/2018      This report was finalized on 11/12/2018 8:26 AM by Dr. Iglesia Gomez.       CT Abdomen Pelvis Without Contrast [[id number]] Collected:  11/09/18 1345     Updated:  11/12/18 0828    Narrative:       EXAMINATION: CT ABDOMEN PELVIS WO CONTRAST - 11/9/2018     INDICATION: Abdominal tenderness, dementia.      TECHNIQUE: CT abdomen and pelvis without intravenous contrast  administration.     The radiation dose reduction device was turned on for each scan per the  ALARA (As Low as Reasonably Achievable) protocol.     COMPARISON: None.     FINDINGS: Lung bases demonstrate small right and trace left pleural  effusions with adjacent subsegmental atelectasis. Liver without focal  lesion. Gallbladder demonstrates contracted appearance with several  calcific densities in the dependent portion indicating cholelithiasis.  No gross intrahepatic biliary or extrahepatic biliary dilatation.  Pancreas and spleen grossly unremarkable. Adrenals have questionable  thickening bilaterally however no distinct nodule. Kidneys without  hydronephrosis or hydroureter demonstrating a 2 mm nonobstructing left  superior pole renal calculus. No obstructive uropathy or urolithiasis is  otherwise evident. Atherosclerotic nonaneurysmal abdominal aorta. No  bulky retroperitoneal adenopathy. GI tract evaluation demonstrates small  hiatal hernia however no disproportionate dilatation of bowel to suggest  mechanical obstructive process. Sigmoid diverticulosis without evidence  for acute diverticulitis. No loculated intra-abdominal fluid collection  with scattered mesenteric edema however no overt ascites. Pelvic viscera  demonstrate mild to moderate distention of the urinary bladder with  circumferential thickening of the bladder wall and surrounding stranding  concerning for cystitis. No loculated pelvic collection or bulky pelvic  adenopathy otherwise. Degenerative  changes of the spine without  aggressive osseous or soft tissue body wall lesions.       Impression:       1. Mild to moderate distention of the urinary bladder with  circumferential bladder wall thickening and surrounding inflammatory  stranding concerning for cystitis without bladder calculi identified or  obstructive uropathy otherwise noted. No hydronephrosis.  2. Sigmoid diverticulosis without evidence for acute diverticulitis.  3. Small right and trace left pleural effusions with adjacent  atelectasis.     DICTATED:   11/9/2018  EDITED/ls :   11/9/2018      This report was finalized on 11/12/2018 8:26 AM by Dr. Iglesia Gomez.                       Results for orders placed during the hospital encounter of 10/12/18   Adult Transthoracic Echo Complete W/ Cont if Necessary Per Protocol    Narrative · Estimated EF = 25-30%. Global hypokinesis  · Mild mitral valve regurgitation is present  · Mild tricuspid valve regurgitation is present.  · Calculated right ventricular systolic pressure from tricuspid   regurgitation is 37 mmHg.  · Mildly reduced right ventricular systolic function noted.            Discharge Details        Discharge Medications      New Medications      Instructions Start Date   amoxicillin-clavulanate 875-125 MG per tablet  Commonly known as:  AUGMENTIN   1 tablet, Oral, Every 12 Hours Scheduled      miconazole 2 % cream  Commonly known as:  MICOTIN   Topical, Every 12 Hours Scheduled         Continue These Medications      Instructions Start Date   acetaminophen 325 MG tablet  Commonly known as:  TYLENOL   650 mg, Oral, Every 4 Hours PRN      aspirin 81 MG EC tablet   81 mg, Oral, Daily      atorvastatin 40 MG tablet  Commonly known as:  LIPITOR   40 mg, Oral, Daily      carvedilol 6.25 MG tablet  Commonly known as:  COREG   6.25 mg, Oral, Every 12 Hours Scheduled      citalopram 20 MG tablet  Commonly known as:  CeleXA   20 mg, Oral, Daily      docusate sodium 100 MG capsule  Commonly known as:   COLACE   200 mg, Oral, Daily      fludrocortisone 0.1 MG tablet   0.1 mg, Oral, Daily      furosemide 40 MG tablet  Commonly known as:  LASIX   40 mg, Oral, Daily      pantoprazole 40 MG EC tablet  Commonly known as:  PROTONIX   40 mg, Oral, Daily      Propylene Glycol 0.6 % solution   0.6 %, Ophthalmic, 3 Times Daily      sacubitril-valsartan 24-26 MG tablet  Commonly known as:  ENTRESTO   1 tablet, Oral, Every 12 Hours Scheduled               Discharge Disposition:  Skilled Nursing Facility (DC - External)    Discharge Diet:  As tolerated    Discharge Activity:   As tolerated          Code Status/Level of Support:  Code Status and Medical Interventions:   Ordered at: 11/09/18 1906     Level Of Support Discussed With:    Patient    Health Care Surrogate     Code Status:    No CPR     Medical Interventions (Level of Support Prior to Arrest):    Comfort Measures     Comments:    Living Will       No future appointments.    Additional Instructions for the Follow-ups that You Need to Schedule     Discharge Follow-up with PCP   As directed       Currently Documented PCP:    Provider, No Known    PCP Phone Number:    [phone]     Follow Up Details:  Dr. Purcell at [facility]               Time Spent on Discharge:  35 minutes    Electronically signed by Chelsey Parmar MD, 11/14/18, 7:26 AM.

## 2018-11-14 NOTE — PLAN OF CARE
Problem: Patient Care Overview  Goal: Plan of Care Review  Outcome: Ongoing (interventions implemented as appropriate)      Problem: Fall Risk (Adult)  Goal: Identify Related Risk Factors and Signs and Symptoms  Outcome: Ongoing (interventions implemented as appropriate)      Problem: Urinary Tract Infection (Adult)  Goal: Signs and Symptoms of Listed Potential Problems Will be Absent, Minimized or Managed (Urinary Tract Infection)  Outcome: Ongoing (interventions implemented as appropriate)      Problem: Wound (Includes Pressure Injury) (Adult)  Goal: Signs and Symptoms of Listed Potential Problems Will be Absent, Minimized or Managed (Wound)  Outcome: Ongoing (interventions implemented as appropriate)

## 2018-11-14 NOTE — PROGRESS NOTES
Continued Stay Note  [facility]     Patient Name: Edwardo Diggs  MRN: [id number]  Today's Date: 11/14/2018    Admit Date: 11/9/2018    Discharge Plan     Row Name 11/14/18 1116       Plan    Plan Comments  SW was contacted by RN reporting that pt is needing some clothes to go home in. SW provided pt with clothes from the clothes closet.    Row Name 11/14/18 9836       Plan    Plan  Long term care at [facility]    Patient/Family in Agreement with Plan  yes    Final Discharge Disposition Code  04 - intermediate care facility    Final Note  Patient transfering back to long term care bed at [facility] today 11/14 with family in private vehicle.  Son Antwon will transport.  Nurse to call report to [phone].  Transfer summary to be faxed to [phone] to [place] unit.  Scripts for narcotics to be sent with patient.  Patient will not require 02 or equipment for transfer.          Discharge Codes    No documentation.       Expected Discharge Date and Time     Expected Discharge Date Expected Discharge Time    Nov 14, 2018             TATO Bird

## 2018-11-14 NOTE — PROGRESS NOTES
Case Management Discharge Note    Final Note: Patient transfering back to long term care bed at [facility] today 11/14 with family in private vehicle.  Son Antwon will transport.  Nurse to call report to [phone].  Transfer summary to be faxed to [phone] to [place] unit.  Scripts for narcotics to be sent with patient.  Patient will not require 02 or equipment for transfer.      Destination - Selection Complete      Service Provider Request Status Selected Services Address Phone Number Fax Number    [facility] [address] [place] [address] [phone]      Durable Medical Equipment      No service has been selected for the patient.      Dialysis/Infusion      No service has been selected for the patient.      Home Medical Care      No service has been selected for the patient.      Community Resources      No service has been selected for the patient.             Final Discharge Disposition Code: 04 - intermediate care facility

## 2018-11-15 NOTE — THERAPY DISCHARGE NOTE
Acute Care - Occupational Therapy Discharge Summary  [facility]     Patient Name: Edwardo Diggs  : 1940  MRN: [id number]    Today's Date: 11/15/2018  Onset of Illness/Injury or Date of Surgery: 18    Date of Referral to OT: 18  Referring Physician: MD Ghulam      Admit Date: 2018        OT Recommendation and Plan    Visit Dx:    ICD-10-CM ICD-9-CM   1. Acute UTI N39.0 599.0   2. Hypotension, unspecified hypotension type I95.9 458.9   3. Generalized weakness R53.1 780.79   4. Impaired functional mobility, balance, gait, and endurance Z74.09 V49.89   5. Impaired mobility and ADLs Z74.09 799.89               OT Rehab Goals     Row Name 11/15/18 1445             Bed Mobility Goal 1 (OT)    Progress/Outcomes (Bed Mobility Goal 1, OT)  goal not met;discharged from facility  -FIDELINA         Transfer Goal 1 (OT)    Progress/Outcome (Transfer Goal 1, OT)  goal not met;discharged from facility  -         Dressing Goal 1 (OT)    Progress/Outcome (Dressing Goal 1, OT)  goal not met;discharged from facility  -FIDELINA        User Key  (r) = Recorded By, (t) = Taken By, (c) = Cosigned By    Initials Name Provider Type Nathalie Frankel, OT Occupational Therapist OT          Outcome Measures     Row Name 18 1330             How much help from another is currently needed...    Putting on and taking off regular lower body clothing?  2  -KF      Bathing (including washing, rinsing, and drying)  2  -KF      Toileting (which includes using toilet bed pan or urinal)  2  -KF      Putting on and taking off regular upper body clothing  3  -KF      Taking care of personal grooming (such as brushing teeth)  3  -KF      Eating meals  3  -KF      Score  15  -KF         Functional Assessment    Outcome Measure Options  AM-PAC 6 Clicks Daily Activity (OT)  -KF        User Key  (r) = Recorded By, (t) = Taken By, (c) = Cosigned By    Initials Name Provider Type    KF Muna Pennington, OT Occupational  Therapist          Therapy Suggested Charges     Code   Minutes Charges    08630 (CPT®) Hc Ot Neuromusc Re Education Ea 15 Min      62745 (CPT®) Hc Ot Ther Proc Ea 15 Min      03229 (CPT®) Hc Ot Therapeutic Act Ea 15 Min      65510 (CPT®) Hc Ot Manual Therapy Ea 15 Min      36960 (CPT®) Hc Ot Iontophoresis Ea 15 Min      79167 (CPT®) Hc Ot Elec Stim Ea-Per 15 Min      50042 (CPT®) Hc Ot Ultrasound Ea 15 Min      72825 (CPT®) Hc Ot Self Care/Mgmt/Train Ea 15 Min 8 1    Total  8 1              OT Discharge Summary  Reason for Discharge: Discharge from facility  Outcomes Achieved: Other(Seen evaluation only prior to discharge, no goals met.)  Discharge Destination:       Nathalie Mosher OT  11/15/2018